# Patient Record
Sex: FEMALE | Race: WHITE | Employment: FULL TIME | URBAN - METROPOLITAN AREA
[De-identification: names, ages, dates, MRNs, and addresses within clinical notes are randomized per-mention and may not be internally consistent; named-entity substitution may affect disease eponyms.]

---

## 2017-04-28 ENCOUNTER — HOSPITAL ENCOUNTER (OUTPATIENT)
Age: 62
Setting detail: SPECIMEN
Discharge: HOME | End: 2017-04-28
Payer: COMMERCIAL

## 2017-04-28 LAB
ABSOLUTE EOS #: 0.2 K/UL (ref 0–0.4)
ABSOLUTE LYMPH #: 3 K/UL (ref 1–4.8)
ABSOLUTE MONO #: 0.5 K/UL (ref 0.1–1.2)
ALBUMIN SERPL-MCNC: 4.2 G/DL (ref 3.5–5.2)
ALBUMIN/GLOBULIN RATIO: 1.6 (ref 1–2.5)
ALP BLD-CCNC: 83 U/L (ref 35–104)
ALT SERPL-CCNC: 33 U/L (ref 5–33)
ANION GAP SERPL CALCULATED.3IONS-SCNC: 15 MMOL/L (ref 9–17)
AST SERPL-CCNC: 20 U/L
BASOPHILS # BLD: 1 %
BASOPHILS ABSOLUTE: 0 K/UL (ref 0–0.2)
BILIRUB SERPL-MCNC: 0.26 MG/DL (ref 0.3–1.2)
BUN BLDV-MCNC: 8 MG/DL (ref 8–23)
BUN/CREAT BLD: ABNORMAL (ref 9–20)
CALCIUM SERPL-MCNC: 9.3 MG/DL (ref 8.6–10.4)
CHLORIDE BLD-SCNC: 103 MMOL/L (ref 98–107)
CHOLESTEROL/HDL RATIO: 4.8
CHOLESTEROL: 216 MG/DL
CO2: 24 MMOL/L (ref 20–31)
CREAT SERPL-MCNC: 0.53 MG/DL (ref 0.5–0.9)
CREATININE URINE: 14.7 MG/DL (ref 28–217)
DIFFERENTIAL TYPE: NORMAL
EOSINOPHILS RELATIVE PERCENT: 2 %
GFR AFRICAN AMERICAN: >60 ML/MIN
GFR NON-AFRICAN AMERICAN: >60 ML/MIN
GFR SERPL CREATININE-BSD FRML MDRD: ABNORMAL ML/MIN/{1.73_M2}
GFR SERPL CREATININE-BSD FRML MDRD: ABNORMAL ML/MIN/{1.73_M2}
GLUCOSE BLD-MCNC: 86 MG/DL (ref 70–99)
HCT VFR BLD CALC: 39.2 % (ref 36–46)
HDLC SERPL-MCNC: 45 MG/DL
HEMOGLOBIN: 13.1 G/DL (ref 12–16)
LDL CHOLESTEROL: 128 MG/DL (ref 0–130)
LYMPHOCYTES # BLD: 38 %
MCH RBC QN AUTO: 27.7 PG (ref 26–34)
MCHC RBC AUTO-ENTMCNC: 33.3 G/DL (ref 31–37)
MCV RBC AUTO: 83 FL (ref 80–100)
MICROALBUMIN/CREAT 24H UR: <12 MG/L
MICROALBUMIN/CREAT UR-RTO: 82 MCG/MG CREAT
MONOCYTES # BLD: 6 %
PDW BLD-RTO: 14.5 % (ref 12.5–15.4)
PLATELET # BLD: 308 K/UL (ref 140–450)
PLATELET ESTIMATE: NORMAL
PMV BLD AUTO: 9.3 FL (ref 6–12)
POTASSIUM SERPL-SCNC: 4.7 MMOL/L (ref 3.7–5.3)
RBC # BLD: 4.72 M/UL (ref 4–5.2)
RBC # BLD: NORMAL 10*6/UL
SEG NEUTROPHILS: 53 %
SEGMENTED NEUTROPHILS ABSOLUTE COUNT: 4.2 K/UL (ref 1.8–7.7)
SODIUM BLD-SCNC: 142 MMOL/L (ref 135–144)
TOTAL PROTEIN: 6.8 G/DL (ref 6.4–8.3)
TRIGL SERPL-MCNC: 215 MG/DL
VLDLC SERPL CALC-MCNC: ABNORMAL MG/DL (ref 1–30)
WBC # BLD: 7.9 K/UL (ref 3.5–11)
WBC # BLD: NORMAL 10*3/UL

## 2017-05-23 ENCOUNTER — HOSPITAL ENCOUNTER (OUTPATIENT)
Age: 62
Setting detail: SPECIMEN
Discharge: HOME | End: 2017-05-23
Payer: COMMERCIAL

## 2017-05-26 LAB — LYME ANTIBODY: 0.26

## 2017-07-07 ENCOUNTER — HOSPITAL ENCOUNTER (OUTPATIENT)
Age: 62
Setting detail: SPECIMEN
Discharge: HOME | End: 2017-07-07
Payer: COMMERCIAL

## 2017-07-07 LAB
ABSOLUTE EOS #: 0.2 K/UL (ref 0–0.4)
ABSOLUTE LYMPH #: 2.3 K/UL (ref 1–4.8)
ABSOLUTE MONO #: 0.4 K/UL (ref 0.1–1.2)
ALBUMIN SERPL-MCNC: 4.1 G/DL (ref 3.5–5.2)
ALBUMIN/GLOBULIN RATIO: 1.6 (ref 1–2.5)
ALP BLD-CCNC: 87 U/L (ref 35–104)
ALT SERPL-CCNC: 33 U/L (ref 5–33)
ANION GAP SERPL CALCULATED.3IONS-SCNC: 13 MMOL/L (ref 9–17)
AST SERPL-CCNC: 21 U/L
BASOPHILS # BLD: 1 %
BASOPHILS ABSOLUTE: 0.1 K/UL (ref 0–0.2)
BILIRUB SERPL-MCNC: 0.3 MG/DL (ref 0.3–1.2)
BUN BLDV-MCNC: 11 MG/DL (ref 8–23)
BUN/CREAT BLD: ABNORMAL (ref 9–20)
CALCIUM SERPL-MCNC: 9.3 MG/DL (ref 8.6–10.4)
CHLORIDE BLD-SCNC: 100 MMOL/L (ref 98–107)
CO2: 26 MMOL/L (ref 20–31)
CREAT SERPL-MCNC: 0.59 MG/DL (ref 0.5–0.9)
CREATININE URINE: 120.4 MG/DL (ref 28–217)
DIFFERENTIAL TYPE: NORMAL
EOSINOPHILS RELATIVE PERCENT: 3 %
GFR AFRICAN AMERICAN: >60 ML/MIN
GFR NON-AFRICAN AMERICAN: >60 ML/MIN
GFR SERPL CREATININE-BSD FRML MDRD: ABNORMAL ML/MIN/{1.73_M2}
GFR SERPL CREATININE-BSD FRML MDRD: ABNORMAL ML/MIN/{1.73_M2}
GLUCOSE BLD-MCNC: 109 MG/DL (ref 70–99)
HCT VFR BLD CALC: 39.6 % (ref 36–46)
HEMOGLOBIN: 13.1 G/DL (ref 12–16)
LYMPHOCYTES # BLD: 29 %
MCH RBC QN AUTO: 27.6 PG (ref 26–34)
MCHC RBC AUTO-ENTMCNC: 33.2 G/DL (ref 31–37)
MCV RBC AUTO: 83.2 FL (ref 80–100)
MICROALBUMIN/CREAT 24H UR: <12 MG/L
MICROALBUMIN/CREAT UR-RTO: 10 MCG/MG CREAT
MONOCYTES # BLD: 6 %
PDW BLD-RTO: 14.6 % (ref 12.5–15.4)
PLATELET # BLD: 289 K/UL (ref 140–450)
PLATELET ESTIMATE: NORMAL
PMV BLD AUTO: 9.2 FL (ref 6–12)
POTASSIUM SERPL-SCNC: 4.5 MMOL/L (ref 3.7–5.3)
RBC # BLD: 4.76 M/UL (ref 4–5.2)
RBC # BLD: NORMAL 10*6/UL
SEG NEUTROPHILS: 61 %
SEGMENTED NEUTROPHILS ABSOLUTE COUNT: 4.9 K/UL (ref 1.8–7.7)
SODIUM BLD-SCNC: 139 MMOL/L (ref 135–144)
TOTAL PROTEIN: 6.7 G/DL (ref 6.4–8.3)
WBC # BLD: 7.9 K/UL (ref 3.5–11)
WBC # BLD: NORMAL 10*3/UL

## 2019-11-23 ENCOUNTER — HOSPITAL ENCOUNTER (OUTPATIENT)
Dept: MAMMOGRAPHY | Age: 64
Discharge: HOME | End: 2019-11-25
Payer: COMMERCIAL

## 2019-11-23 DIAGNOSIS — Z13.820 SCREENING FOR OSTEOPOROSIS: ICD-10-CM

## 2019-11-23 DIAGNOSIS — Z12.31 ENCOUNTER FOR SCREENING MAMMOGRAM FOR BREAST CANCER: ICD-10-CM

## 2019-11-23 PROCEDURE — 77080 DXA BONE DENSITY AXIAL: CPT

## 2019-11-23 PROCEDURE — 77063 BREAST TOMOSYNTHESIS BI: CPT

## 2020-01-28 ENCOUNTER — TELEPHONE (OUTPATIENT)
Dept: DERMATOLOGY | Age: 65
End: 2020-01-28

## 2020-02-27 ENCOUNTER — HOSPITAL ENCOUNTER (OUTPATIENT)
Dept: GENERAL RADIOLOGY | Age: 65
Discharge: HOME | End: 2020-02-29
Payer: COMMERCIAL

## 2020-02-27 ENCOUNTER — OFFICE VISIT (OUTPATIENT)
Dept: ORTHOPEDIC SURGERY | Age: 65
End: 2020-02-27
Payer: COMMERCIAL

## 2020-02-27 ENCOUNTER — HOSPITAL ENCOUNTER (OUTPATIENT)
Age: 65
Discharge: HOME | End: 2020-02-29
Payer: COMMERCIAL

## 2020-02-27 VITALS — WEIGHT: 270 LBS | HEIGHT: 62 IN | BODY MASS INDEX: 49.69 KG/M2

## 2020-02-27 PROCEDURE — 73562 X-RAY EXAM OF KNEE 3: CPT

## 2020-02-27 PROCEDURE — 99212 OFFICE O/P EST SF 10 MIN: CPT | Performed by: ORTHOPAEDIC SURGERY

## 2020-02-27 PROCEDURE — G8484 FLU IMMUNIZE NO ADMIN: HCPCS | Performed by: ORTHOPAEDIC SURGERY

## 2020-02-27 PROCEDURE — 1036F TOBACCO NON-USER: CPT | Performed by: ORTHOPAEDIC SURGERY

## 2020-02-27 PROCEDURE — 3017F COLORECTAL CA SCREEN DOC REV: CPT | Performed by: ORTHOPAEDIC SURGERY

## 2020-02-27 PROCEDURE — G8417 CALC BMI ABV UP PARAM F/U: HCPCS | Performed by: ORTHOPAEDIC SURGERY

## 2020-02-27 PROCEDURE — G8427 DOCREV CUR MEDS BY ELIG CLIN: HCPCS | Performed by: ORTHOPAEDIC SURGERY

## 2020-02-27 PROCEDURE — 20610 DRAIN/INJ JOINT/BURSA W/O US: CPT | Performed by: ORTHOPAEDIC SURGERY

## 2020-02-27 RX ORDER — NAPROXEN 500 MG/1
500 TABLET ORAL 2 TIMES DAILY WITH MEALS
Qty: 60 TABLET | Refills: 3 | Status: SHIPPED | OUTPATIENT
Start: 2020-02-27 | End: 2020-08-04

## 2020-02-27 NOTE — PROGRESS NOTES
Chief Complaint   Patient presents with    Knee Pain     right     This patient is seen here for an pain in the right knee. The patient says that she lost her  recently and she had gone to a cruise and did a lot of walking. Since she came back she has been having a lot of pain in the right knee. She describes the pain across the knee joint. More of the pain is on the lateral side. There is no history of instability or locking episode. The pain is constant. It is worse first thing in the morning and then also with start up pain. Examination: She stands with valgus deformity bilaterally. She has excellent range of motion. There is no instability. I reviewed her x-rays which show that the she has significant bilateral valgus osteoarthritis of the knees. Diagnosis: Symptomatic right knee valgus osteoarthritis. Treatment: Under sterile condition I injected 40 mg Depo-Medrol and 5 cc of 1% plain lidocaine through anteromedial portal without any complications into the right knee. The patient is going to go to [place] and I told her to come return to see us when she comes back from [place]. Have already explained to her how the injection works. She should use a cane in the left hand while she is there because she is going to be doing quite a bit of walking. If she comes back she is asymptomatic then she will call and cancel the appointment.

## 2020-08-04 ENCOUNTER — OFFICE VISIT (OUTPATIENT)
Dept: DERMATOLOGY | Age: 65
End: 2020-08-04
Payer: COMMERCIAL

## 2020-08-04 VITALS
BODY MASS INDEX: 51.41 KG/M2 | TEMPERATURE: 97 F | HEIGHT: 62 IN | WEIGHT: 279.4 LBS | DIASTOLIC BLOOD PRESSURE: 80 MMHG | OXYGEN SATURATION: 96 % | SYSTOLIC BLOOD PRESSURE: 136 MMHG | HEART RATE: 66 BPM

## 2020-08-04 PROCEDURE — G8427 DOCREV CUR MEDS BY ELIG CLIN: HCPCS | Performed by: DERMATOLOGY

## 2020-08-04 PROCEDURE — 17110 DESTRUCTION B9 LES UP TO 14: CPT | Performed by: DERMATOLOGY

## 2020-08-04 PROCEDURE — 3017F COLORECTAL CA SCREEN DOC REV: CPT | Performed by: DERMATOLOGY

## 2020-08-04 PROCEDURE — 99203 OFFICE O/P NEW LOW 30 MIN: CPT | Performed by: DERMATOLOGY

## 2020-08-04 PROCEDURE — G8417 CALC BMI ABV UP PARAM F/U: HCPCS | Performed by: DERMATOLOGY

## 2020-08-04 PROCEDURE — 1036F TOBACCO NON-USER: CPT | Performed by: DERMATOLOGY

## 2020-08-04 RX ORDER — KETOCONAZOLE 20 MG/G
CREAM TOPICAL
Qty: 60 G | Refills: 2 | Status: SHIPPED | OUTPATIENT
Start: 2020-08-04

## 2020-08-04 RX ORDER — BIOTIN 1 MG
1000 TABLET ORAL DAILY
COMMUNITY

## 2020-08-04 RX ORDER — LANOLIN ALCOHOL/MO/W.PET/CERES
CREAM (GRAM) TOPICAL
COMMUNITY

## 2020-08-04 NOTE — PATIENT INSTRUCTIONS
PM.      Moles    Moles, or nevi, are very common. Moles are areas of the skin where there are more cells called melanocytes. Melanocytes are the cells in the body that produce pigment, or color. Moles can be many colors including skin-tone, pink, tan, brown, and very dark brown to black. Moles can be raised or flat. Moles can have hair. Moles can grow on any skin surface, including the scalp, hands and feet. When someone is born with a mole, or develops one in the first months of life, the mole is called a congenital, or birthmark mole. About 1 in 100 people are born with one or more moles. Most people develop their moles later in childhood or adulthood. These are called acquired moles. They are most common on sun exposed areas of skin such as the face, neck, upper body, arms and legs. CHECKING MOLES  Most moles are harmless, but in rare cases moles may become cancerous. Checking moles and looking for changes is an important step in helping to catch worrisome changes early. Some changes to look for are asymmetry (moles that do not look the same on each half), irregular shapes or borders, uneven color or large size. Also look for any moles that bleed, itch, or become painful. Looking at your skin regularly can help you recognize moles that are more at risk for becoming cancerous. WHEN TO CALL THE DOCTOR  Call your doctor if you see any of the following changes in a mole:       Irregular borders (uneven shape or edges)       Changes in color to black, blue or red.      Changes in the surface texture       Scabs, scaling, irritation or bleeding in the mole    TREATMENT FOR MOLES  Often we can simply look at your moles and tell you if they look worrisome. If we are not concerned about the look of your moles at your appointment, we may measure some moles and take some photos that will allow us to watch for future changes in the moles.      TREATMENT FOR MOLES  If a mole is getting irritated frequently, circulation. Intertrigo frequently is worsened by infection, which most commonly is with Candida species. Seborrheic Keratosis  Seborrheic keratoses are common benign growths of unknown cause seen in adults due to a thickening of an area of the top skin layer. Who's At Risk  Although they can occur anytime after puberty, almost everyone over 48 has one or more of these and they increase in number with age. Some families have an inherited tendency to grow multiple lesions. Men and women are equally as likely to develop seborrheic keratoses. Dark-skinned people are less affected than those with light skin; a variant seen in blacks is called dermatosis papulosa nigra. Signs & Symptoms  One or more spots can occur anywhere on the body, except for palms, soles, and mucous membranes (eg, in the mouth or rectum). They do not go away. They do not turn into cancers, but some cancers resemble seborrheic keratosis. They start as light brown to skin-colored, flat areas, which are round to oval and of varying size (usually less than a half inch, but sometimes much larger). As they grow thicker and rise above the skin surface, seborrheic keratoses may become dark brown to almost black with a \"stuck on\" appearance. The surface may feel smooth or rough. Self-Care Guidelines  No treatment is needed unless there is irritation from clothing with itching or bleeding. There is no way to prevent new spots from forming. Some lotions with alpha hydroxyl acids may make the areas feel smoother with regular use but will not eliminate them. OTC freezing techniques are available but usually not effective. When to [facility]  If a spot on the skin is growing, bleeding, painful, or itchy, or any other concerning changes, then see your doctor. Tonye Cogan

## 2022-07-25 LAB — MAMMOGRAPHY, EXTERNAL: NORMAL

## 2022-09-29 NOTE — PROGRESS NOTES
Dermatology Patient Note  [facility] [address] [facility]  [address]  [address]  Dept: [phone]  Dept Fax: [phone]      VISIT DATE: 8/4/2020   REFERRING PROVIDER: No ref. provider found      Kristin Stout is a 59 y.o. female  who presents today in the office for:    New Patient (Spots of concern on the arms that are multiplying, also has a rash under breasts and in folds near groin. It itches and burns after scratching. Tried baby powder and creams but nothing works. Athletes foot cream was recommended and it made it much worse)      HISTORY OF PRESENT ILLNESS:  HPI AK/NMSC    Nilay Agudelo was seen today for initial evaluation of Sun Damage    Duration of Lesion/Lesions:developed over years    Course: new lesion itchy on back and right arm     Areas of Involvement: Sun exposed skin surfaces> covered    Associated Symptoms:Itching    Exacerbating Factors:Hx of Prolonged Sun Exposure    Previous Skin Cancers: none    Problem Specific Family Hx: none    Also has a rash under breasts and in groin folds that has not improved with baby powder      CURRENT MEDICATIONS:   Current Outpatient Medications   Medication Sig Dispense Refill    Biotin 1000 MCG TABS Take 1,000 mcg by mouth daily      Cholecalciferol (VITAMIN D3) 125 MCG (5000 UT) CAPS GNP Vitamin D Super Strength 5000 UNIT Oral Tablet  Take 1 tablet daily   Refills: 0  Active      vitamin B-12 (CYANOCOBALAMIN) 1000 MCG tablet Vitamin B-12 1000 MCG Oral Tablet  TAKE 1 TABLET DAILY AS DIRECTED. Refills: 0  Active      ketoconazole (NIZORAL) 2 % cream Apply twice daily to groin rash 60 g 2    sertraline (ZOLOFT) 50 MG tablet       Handicap Placard MISC by Does not apply route. For 3 months only 1 each 0    Loratadine (CLARITIN PO) Take  by mouth.  NEXIUM 40 MG capsule       TOPROL XL 50 MG XL tablet       traMADol (ULTRAM) 50 MG tablet        No current facility-administered medications for this visit. ALLERGIES:   Allergies   Allergen Reactions    Aspirin Nausea And Vomiting       SOCIAL HISTORY:  Social History     Tobacco Use    Smoking status: Former Smoker     Last attempt to quit: 2000     Years since quittin.1    Smokeless tobacco: Never Used   Substance Use Topics    Alcohol use: Not Currently       REVIEW OF SYSTEMS:  Review of Systems   Constitutional: Negative. Skin:Denies any new changing, growing or bleeding lesions or rashes except as described in the HPI     PHYSICAL EXAM:   /80 (Site: Right Lower Arm, Position: Sitting, Cuff Size: Medium Adult)   Pulse 66   Temp 97 °F (36.1 °C)   Ht 5' 2\" (1.575 m)   Wt 279 lb 6.4 oz (126.7 kg)   SpO2 96%   BMI 51.10 kg/m²     General Exam:  General Appearance: No acute distress, Well nourished     Neuro: Alert and oriented to person, place and time  Psych: Normal affect   Lymph Node: Not performed    Cutaneous Exam: Performed as documented in clinic note below. Full skin,which includes the head/face, neck, both arms, chest, back, abdomen, both legs, genitalia and/or groin and/or buttocks, digits and/or nails, was examined. Pertinent Physical Exam Findings:  Physical Exam  Skin:            Comments: Actinic damage of the face, neck, trunk and upper and lower extremities           Medical Necessity of Exam Performed:   Distribution of patient concerns    Additional Diagnostic Testing performed during exam: Not performed ,  Not performed    ASSESSMENT:   Diagnosis Orders   1. Actinic skin damage     2. Inflamed seborrheic keratosis     3. Inflamed skin tag     4. Intertrigo         Plan of Action is as Follows:  Assessment 1. Actinic skin damage  Discussed sunscreen and sun protection - recommend SPF 30 or greater sunscreen applied every 2-3 hours, sun protective clothing and avoidance of peak sun.     2. Inflamed seborrheic keratosis  Cryotherapy: After verbal consent was obtained including discussion of the risks (lesion both UVA and UVB sunrays. Your sunscreen should contain at least one of the following ingredients: titanium dioxide, zinc oxide, or avobenzone. Sunscreen will not be effective unless it is applied to all exposed skin. Sunscreens work best if they are applied 30 minutes before sun exposure. They should be reapplied every 2 hours and after any water exposure. Sunscreen is not perfect. It is important to use other methods to protect the skin from sun exposure also. Wear hats, sunglasses and other sun protective clothing when outdoors. Stay in the shade during the peak hours of sun exposure between 10 AM and 4 PM.      Moles    Moles, or nevi, are very common. Moles are areas of the skin where there are more cells called melanocytes. Melanocytes are the cells in the body that produce pigment, or color. Moles can be many colors including skin-tone, pink, tan, brown, and very dark brown to black. Moles can be raised or flat. Moles can have hair. Moles can grow on any skin surface, including the scalp, hands and feet. When someone is born with a mole, or develops one in the first months of life, the mole is called a congenital, or birthmark mole. About 1 in 100 people are born with one or more moles. Most people develop their moles later in childhood or adulthood. These are called acquired moles. They are most common on sun exposed areas of skin such as the face, neck, upper body, arms and legs. CHECKING MOLES  Most moles are harmless, but in rare cases moles may become cancerous. Checking moles and looking for changes is an important step in helping to catch worrisome changes early. Some changes to look for are asymmetry (moles that do not look the same on each half), irregular shapes or borders, uneven color or large size. Also look for any moles that bleed, itch, or become painful. Looking at your skin regularly can help you recognize moles that are more at risk for becoming cancerous.      WHEN TO CALL THE DOCTOR  Call your doctor if you see any of the following changes in a mole:       Irregular borders (uneven shape or edges)       Changes in color to black, blue or red.      Changes in the surface texture       Scabs, scaling, irritation or bleeding in the mole    TREATMENT FOR MOLES  Often we can simply look at your moles and tell you if they look worrisome. If we are not concerned about the look of your moles at your appointment, we may measure some moles and take some photos that will allow us to watch for future changes in the moles. TREATMENT FOR MOLES  If a mole is getting irritated frequently, bleeding, difficult to watch due to location or dark color, atypical in appearance, or worrisome, we may perform a skin biopsy. A skin biopsy is a procedure that involves removing the mole so that it can be looked at under a microscope. There are many methods used to remove moles. The method we choose depends on the location of the mole, the size of the mole, and the amount of concern for skin cancer. Generally, removing moles in the dermatologists office is a simple and safe procedure that can be done with local anesthesia. PREVENTION  You can do some things to prevent moles from becoming cancerous:       Try to avoid long periods of time in the sun and severe sunburns. The sun is        especially dangerous between 10:00 am and 4:00 pm.       Use a broad spectrum, water-resistant sun block lotion with an SPF of 30 or        greater. A broad spectrum lotion blocks both UVA and UVB rays from the sun. Re-apply sunscreen at least every 2 hours and after swimming or sweating.      Take advantage of shade whenever possible. Wear a broad-brimmed hat,        sunglasses, and protective clothing when outdoors.      Do not use tanning beds. Cryotherapy    Liquid Nitrogen - \"freeze\" (Cryotherapy)  Your doctor has treated your skin lesions with a very cold substance.   The liquid nitrogen is so cold that it may feel like the skin is burning during application. A clear blister or blood blister may form after treatment and may later form a scab. Leave the area alone. Usually this scab will fall of within 1-2 weeks. The area should be kept clean and can be covered with Vaseline and a Band-Aid if needed. If a large blister develops it is ok to use a clean needle to gently pop the blister. Please call our office with any concerns at [phone]. Intertrigo (intertriginous dermatitis) is an inflammatory condition of skin folds, induced or aggravated by heat, moisture, maceration, friction, and lack of air circulation. Intertrigo frequently is worsened by infection, which most commonly is with Candida species. Seborrheic Keratosis  Seborrheic keratoses are common benign growths of unknown cause seen in adults due to a thickening of an area of the top skin layer. Who's At Risk  Although they can occur anytime after puberty, almost everyone over 48 has one or more of these and they increase in number with age. Some families have an inherited tendency to grow multiple lesions. Men and women are equally as likely to develop seborrheic keratoses. Dark-skinned people are less affected than those with light skin; a variant seen in blacks is called dermatosis papulosa nigra. Signs & Symptoms  One or more spots can occur anywhere on the body, except for palms, soles, and mucous membranes (eg, in the mouth or rectum). They do not go away. They do not turn into cancers, but some cancers resemble seborrheic keratosis. They start as light brown to skin-colored, flat areas, which are round to oval and of varying size (usually less than a half inch, but sometimes much larger). As they grow thicker and rise above the skin surface, seborrheic keratoses may become dark brown to almost black with a \"stuck on\" appearance. The surface may feel smooth or rough.   Self-Care Guidelines  No treatment is needed unless there is irritation from clothing with itching or bleeding. There is no way to prevent new spots from forming. Some lotions with alpha hydroxyl acids may make the areas feel smoother with regular use but will not eliminate them. OTC freezing techniques are available but usually not effective. When to [facility]  If a spot on the skin is growing, bleeding, painful, or itchy, or any other concerning changes, then see your doctor. .        Photo surveillance performed: No    Follow-up: 1 year    This note was created with the assistance of aspeech-recognition program.  Although the intention is to generate a document that actually reflects thecontent of the visit, no guarantees can be provided that every mistake has been identified and corrected by editing.     Electronically signed by Chirag Soto MD on 8/4/20 at 11:40 AM EDT 70

## 2023-06-29 LAB
CHOLESTEROL, TOTAL: 217 MG/DL
CHOLESTEROL/HDL RATIO: 5.2
HDLC SERPL-MCNC: 42 MG/DL (ref 35–70)
LDL CHOLESTEROL CALCULATED: 107 MG/DL (ref 0–160)
NONHDLC SERPL-MCNC: ABNORMAL MG/DL
TRIGL SERPL-MCNC: 339 MG/DL
VLDLC SERPL CALC-MCNC: 68 MG/DL

## 2023-07-14 DIAGNOSIS — M16.0 PRIMARY OSTEOARTHRITIS OF BOTH HIPS: Primary | ICD-10-CM

## 2023-07-14 RX ORDER — TRAMADOL HYDROCHLORIDE 50 MG/1
50 TABLET ORAL 2 TIMES DAILY
COMMUNITY
End: 2023-07-14 | Stop reason: SDUPTHER

## 2023-07-14 RX ORDER — TRAMADOL HYDROCHLORIDE 50 MG/1
50 TABLET ORAL 2 TIMES DAILY
Qty: 60 TABLET | Refills: 0 | Status: SHIPPED | OUTPATIENT
Start: 2023-07-14 | End: 2023-08-13

## 2023-08-18 DIAGNOSIS — M16.0 PRIMARY OSTEOARTHRITIS OF BOTH HIPS: Primary | ICD-10-CM

## 2023-08-18 RX ORDER — TRAMADOL HYDROCHLORIDE 50 MG/1
50 TABLET ORAL 2 TIMES DAILY
COMMUNITY
End: 2023-08-18 | Stop reason: SDUPTHER

## 2023-08-18 RX ORDER — TRAMADOL HYDROCHLORIDE 50 MG/1
50 TABLET ORAL 2 TIMES DAILY
Qty: 60 TABLET | Refills: 0 | Status: SHIPPED | OUTPATIENT
Start: 2023-08-18 | End: 2023-09-17

## 2023-09-25 DIAGNOSIS — M16.0 PRIMARY OSTEOARTHRITIS OF BOTH HIPS: Primary | ICD-10-CM

## 2023-09-25 RX ORDER — TRAMADOL HYDROCHLORIDE 50 MG/1
50 TABLET ORAL EVERY 4 HOURS PRN
Qty: 18 TABLET | Refills: 0 | Status: SHIPPED | OUTPATIENT
Start: 2023-09-25 | End: 2023-09-28

## 2023-10-18 DIAGNOSIS — M16.0 PRIMARY OSTEOARTHRITIS OF BOTH HIPS: Primary | ICD-10-CM

## 2023-10-18 RX ORDER — TRAMADOL HYDROCHLORIDE 50 MG/1
50 TABLET ORAL EVERY 8 HOURS PRN
Qty: 42 TABLET | Refills: 0 | Status: SHIPPED | OUTPATIENT
Start: 2023-10-18 | End: 2023-11-17

## 2023-11-09 RX ORDER — METOPROLOL SUCCINATE 50 MG/1
50 TABLET, EXTENDED RELEASE ORAL DAILY
Qty: 90 TABLET | Refills: 3 | Status: SHIPPED | OUTPATIENT
Start: 2023-11-09

## 2023-11-14 PROBLEM — E66.813 CLASS 3 SEVERE OBESITY DUE TO EXCESS CALORIES WITH BODY MASS INDEX (BMI) OF 45.0 TO 49.9 IN ADULT: Status: ACTIVE | Noted: 2023-11-14

## 2023-11-14 PROBLEM — Z78.9 NON-SMOKER: Status: ACTIVE | Noted: 2023-11-14

## 2023-11-14 PROBLEM — E66.01 CLASS 3 SEVERE OBESITY DUE TO EXCESS CALORIES WITH BODY MASS INDEX (BMI) OF 45.0 TO 49.9 IN ADULT (HCC): Status: ACTIVE | Noted: 2023-11-14

## 2023-11-14 NOTE — PATIENT INSTRUCTIONS
Continue on diet and lifestyle changes. , I would continue to recommend a diet focused on vegetables, fruits, health protein that minimizes sweets, watch more than 1 portion of carbohydrates in a meal, avoid high sugar beverages and excess red meats. Maintain a healthy BMI 30-35 good goal for you. Work to a lifestyle of regular a aerobic exercise = 40 minutes mild to moderate physical activity 3-4 times a week. Keep working on diet and exercise as you have done in the past. A good goal would be to come down about 10 lb in the next 3 months. Also moderate exercise as tolerated. Followup for medicare annual wellness.

## 2023-11-14 NOTE — PROGRESS NOTES
[id number] [facility]  [address] [facility] [place], [address] [facility]  Phone: [phone]  Fax: [phone]      Date of Visit:  2023  Patient Name: Debbie Perez   Patient :  1955     Assessment:     1. Chronic left hip pain    2. Class 3 severe obesity due to excess calories without serious comorbidity with body mass index (BMI) of 45.0 to 49.9 in adult ([address] [facility])    3. Non-smoker    4. Primary hypertension    5. Gastroesophageal reflux disease without esophagitis    6. Vitamin D deficiency    7. Mixed hyperlipidemia    8. Diverticulitis    9. Primary osteoarthritis of both hips    10. Sprain of left rotator cuff capsule, initial encounter        Plan:    BMI was elevated today, and weight loss plan recommended is : conventional weight loss. Patient Instructions   Continue on diet and lifestyle changes. , I would continue to recommend a diet focused on vegetables, fruits, health protein that minimizes sweets, watch more than 1 portion of carbohydrates in a meal, avoid high sugar beverages and excess red meats. Maintain a healthy BMI 30-35 good goal for you. Work to a lifestyle of regular a aerobic exercise = 40 minutes mild to moderate physical activity 3-4 times a week. Keep working on diet and exercise as you have done in the past. A good goal would be to come down about 10 lb in the next 3 months. Also moderate exercise as tolerated. Followup for medicare annual wellness. Requested Prescriptions     Signed Prescriptions Disp Refills    traMADol (ULTRAM) 50 MG tablet 60 tablet 0     Sig: Take 1 tablet by mouth every 8 hours as needed for Pain for up to 30 days. Intended supply: 3 days. Take lowest dose possible to manage pain Max Daily Amount: 150 mg       Medications Discontinued During This Encounter   Medication Reason    traMADol (ULTRAM) 50 MG tablet REORDER    Emollient (COLLAGEN EX) ERROR       No follow-ups on file.     Conrad Clay was

## 2023-11-16 ENCOUNTER — OFFICE VISIT (OUTPATIENT)
Age: 68
End: 2023-11-16
Payer: MEDICARE

## 2023-11-16 VITALS
TEMPERATURE: 97.7 F | DIASTOLIC BLOOD PRESSURE: 62 MMHG | BODY MASS INDEX: 46.93 KG/M2 | HEART RATE: 74 BPM | SYSTOLIC BLOOD PRESSURE: 137 MMHG | RESPIRATION RATE: 16 BRPM | WEIGHT: 255 LBS | HEIGHT: 62 IN

## 2023-11-16 DIAGNOSIS — Z78.9 NON-SMOKER: ICD-10-CM

## 2023-11-16 DIAGNOSIS — E66.01 CLASS 3 SEVERE OBESITY DUE TO EXCESS CALORIES WITHOUT SERIOUS COMORBIDITY WITH BODY MASS INDEX (BMI) OF 45.0 TO 49.9 IN ADULT (HCC): ICD-10-CM

## 2023-11-16 DIAGNOSIS — K21.9 GASTROESOPHAGEAL REFLUX DISEASE WITHOUT ESOPHAGITIS: ICD-10-CM

## 2023-11-16 DIAGNOSIS — K57.92 DIVERTICULITIS: ICD-10-CM

## 2023-11-16 DIAGNOSIS — E55.9 VITAMIN D DEFICIENCY: ICD-10-CM

## 2023-11-16 DIAGNOSIS — E78.2 MIXED HYPERLIPIDEMIA: ICD-10-CM

## 2023-11-16 DIAGNOSIS — I10 PRIMARY HYPERTENSION: ICD-10-CM

## 2023-11-16 DIAGNOSIS — M25.552 CHRONIC LEFT HIP PAIN: Primary | ICD-10-CM

## 2023-11-16 DIAGNOSIS — S43.422A SPRAIN OF LEFT ROTATOR CUFF CAPSULE, INITIAL ENCOUNTER: ICD-10-CM

## 2023-11-16 DIAGNOSIS — G89.29 CHRONIC LEFT HIP PAIN: Primary | ICD-10-CM

## 2023-11-16 DIAGNOSIS — M16.0 PRIMARY OSTEOARTHRITIS OF BOTH HIPS: ICD-10-CM

## 2023-11-16 PROCEDURE — 99214 OFFICE O/P EST MOD 30 MIN: CPT

## 2023-11-16 PROCEDURE — 99214 OFFICE O/P EST MOD 30 MIN: CPT | Performed by: FAMILY MEDICINE

## 2023-11-16 PROCEDURE — 1123F ACP DISCUSS/DSCN MKR DOCD: CPT | Performed by: FAMILY MEDICINE

## 2023-11-16 PROCEDURE — 3075F SYST BP GE 130 - 139MM HG: CPT | Performed by: FAMILY MEDICINE

## 2023-11-16 PROCEDURE — 3078F DIAST BP <80 MM HG: CPT | Performed by: FAMILY MEDICINE

## 2023-11-16 RX ORDER — TRAMADOL HYDROCHLORIDE 50 MG/1
50 TABLET ORAL EVERY 8 HOURS PRN
Qty: 60 TABLET | Refills: 0 | Status: SHIPPED | OUTPATIENT
Start: 2023-11-16 | End: 2023-12-16

## 2023-11-16 SDOH — ECONOMIC STABILITY: FOOD INSECURITY: WITHIN THE PAST 12 MONTHS, YOU WORRIED THAT YOUR FOOD WOULD RUN OUT BEFORE YOU GOT MONEY TO BUY MORE.: NEVER TRUE

## 2023-11-16 SDOH — ECONOMIC STABILITY: INCOME INSECURITY: HOW HARD IS IT FOR YOU TO PAY FOR THE VERY BASICS LIKE FOOD, HOUSING, MEDICAL CARE, AND HEATING?: NOT HARD AT ALL

## 2023-11-16 SDOH — ECONOMIC STABILITY: HOUSING INSECURITY
IN THE LAST 12 MONTHS, WAS THERE A TIME WHEN YOU DID NOT HAVE A STEADY PLACE TO SLEEP OR SLEPT IN A SHELTER (INCLUDING NOW)?: NO

## 2023-11-16 SDOH — ECONOMIC STABILITY: FOOD INSECURITY: WITHIN THE PAST 12 MONTHS, THE FOOD YOU BOUGHT JUST DIDN'T LAST AND YOU DIDN'T HAVE MONEY TO GET MORE.: NEVER TRUE

## 2023-11-16 ASSESSMENT — PATIENT HEALTH QUESTIONNAIRE - PHQ9
1. LITTLE INTEREST OR PLEASURE IN DOING THINGS: 0
SUM OF ALL RESPONSES TO PHQ QUESTIONS 1-9: 0
2. FEELING DOWN, DEPRESSED OR HOPELESS: 0
SUM OF ALL RESPONSES TO PHQ QUESTIONS 1-9: 0
SUM OF ALL RESPONSES TO PHQ9 QUESTIONS 1 & 2: 0

## 2024-01-08 DIAGNOSIS — M16.0 PRIMARY OSTEOARTHRITIS OF BOTH HIPS: ICD-10-CM

## 2024-01-08 RX ORDER — TRAMADOL HYDROCHLORIDE 50 MG/1
50 TABLET ORAL EVERY 12 HOURS
Qty: 60 TABLET | Refills: 0 | Status: SHIPPED | OUTPATIENT
Start: 2024-01-08 | End: 2024-02-07

## 2024-02-10 NOTE — PROGRESS NOTES
Medicare Service Recommended Frequency Last Done Due next   Pneumonia vaccine After 65, Prevnar 20 ONCE You received the Pneumovax 23 on 11/3/2020 You are due for the Prevnar 20 today   Influenza vaccine Yearly 2020 Please consider getting your flu shot each season   Zoster/Shingles Shingrix vaccine:  2 shots 2-6 months apart  You received the Shingrix on 3/29/2020 and 1/18/2020 You are up-to-date and will not need any more shingles vaccines   COVID Variable You had 2 COVID vaccines You could get the booster if you would like   Tetanus vaccine (Td or Tdap) Every 10 years  Your last tetanus vaccine was on 5/14/2013 You are due at this time so please consider getting this at the pharmacy   Mammogram Every 1-2 years (ages ~40-75) Your last mammogram was on 8/18/2022 They recommended a 1 year repeat so you are overdue at this time.  I can have Dr. Ferrer order one for you   Colorectal Cancer Screening FIT test yearly, Cologuard every 3 years, Colonoscopy every 10 years *unless otherwise indicated* You had a colonoscopy on 8/18/2021 The report indicated a 10 year repeat   Cardiovascular/cholesterol screening As determined by your physician 6/29/23  Total cholesterol: 217  Triglycerides: 339  HDL (good cholesterol): 42  LDL (bad cholesterol): 107 As determined by your physician   Diabetes screening   As determined by your physician 6/29/23  A1c/glucose: 93 As determined by your physician   Osteoporosis screening   DEXA every 2 years for females (ages 65-85) You had a DEXA on 8/18/2022 which was normal You will be due after 8/18/2024   Screening for abdominal aortic aneurysm One-time screening in patients if you have a family history of abdominal aortic aneurysms, or you're a man 65-75 and have smoked at least 100 cigarettes in your lifetime Does not qualify    Low Dose CT/Lung Cancer  Annual ages 50 to 77 (80) years who have a 20 pack-year smoking history and currently smoke or have quit within the past 15 years Does not

## 2024-02-12 ENCOUNTER — OFFICE VISIT (OUTPATIENT)
Age: 69
End: 2024-02-12
Payer: MEDICARE

## 2024-02-12 VITALS
SYSTOLIC BLOOD PRESSURE: 110 MMHG | DIASTOLIC BLOOD PRESSURE: 53 MMHG | HEART RATE: 64 BPM | BODY MASS INDEX: 46.38 KG/M2 | HEIGHT: 62 IN | WEIGHT: 252 LBS | RESPIRATION RATE: 16 BRPM

## 2024-02-12 DIAGNOSIS — Z00.00 MEDICARE ANNUAL WELLNESS VISIT, SUBSEQUENT: Primary | ICD-10-CM

## 2024-02-12 PROCEDURE — G0439 PPPS, SUBSEQ VISIT: HCPCS | Performed by: PHARMACIST

## 2024-02-12 PROCEDURE — 3074F SYST BP LT 130 MM HG: CPT | Performed by: PHARMACIST

## 2024-02-12 PROCEDURE — 1123F ACP DISCUSS/DSCN MKR DOCD: CPT | Performed by: PHARMACIST

## 2024-02-12 PROCEDURE — 3078F DIAST BP <80 MM HG: CPT | Performed by: PHARMACIST

## 2024-02-12 RX ORDER — PHENOL 1.4 %
AEROSOL, SPRAY (ML) MUCOUS MEMBRANE
COMMUNITY

## 2024-02-12 RX ORDER — FAMOTIDINE, CALCIUM CARBONATE, AND MAGNESIUM HYDROXIDE 10; 800; 165 MG/1; MG/1; MG/1
1 TABLET, CHEWABLE ORAL AS NEEDED
COMMUNITY

## 2024-02-12 RX ORDER — CETIRIZINE HYDROCHLORIDE 10 MG/1
10 TABLET ORAL DAILY
COMMUNITY

## 2024-02-12 NOTE — PATIENT INSTRUCTIONS
Thank you for coming in today and allowing me to be part of your care team.    See if you can find some smoke detectors.  If you have questions about advanced directives, please call [facility] our  with any issues.   Medicare Service Recommended Frequency Last Done Due next   Pneumonia vaccine After 65, Prevnar 20 ONCE You received the Pneumovax 23 on 11/3/2020 You are due for the Prevnar 20 today   Influenza vaccine Yearly 2020 Please consider getting your flu shot each season   Zoster/Shingles Shingrix vaccine:  2 shots 2-6 months apart  You received the Shingrix on 3/29/2020 and 1/18/2020 You are up-to-date and will not need any more shingles vaccines   COVID Variable You had 2 COVID vaccines You could get the booster if you would like   Tetanus vaccine (Td or Tdap) Every 10 years  Your last tetanus vaccine was on 5/14/2013 You are due at this time so please consider getting this at the pharmacy   Mammogram Every 1-2 years (ages ~40-75) Your last mammogram was on 8/18/2022 They recommended a 1 year repeat so you are overdue at this time.  I can have Dr. Ferrer order one for you   Colorectal Cancer Screening FIT test yearly, Cologuard every 3 years, Colonoscopy every 10 years *unless otherwise indicated* You had a colonoscopy on 8/18/2021 The report indicated a 10 year repeat   Cardiovascular/cholesterol screening As determined by your physician 6/29/23  Total cholesterol: 217  Triglycerides: 339  HDL (good cholesterol): 42  LDL (bad cholesterol): 107 As determined by your physician   Diabetes screening   As determined by your physician 6/29/23  A1c/glucose: 93 As determined by your physician   Osteoporosis screening   DEXA every 2 years for females (ages 65-85) You had a DEXA on 8/18/2022 which was normal You will be due after 8/18/2024   Screening for abdominal aortic aneurysm One-time screening in patients if you have a family history of abdominal aortic aneurysms, or you're a man 65-75 and have smoked at

## 2024-03-11 DIAGNOSIS — M16.0 PRIMARY OSTEOARTHRITIS OF BOTH HIPS: ICD-10-CM

## 2024-03-11 RX ORDER — TRAMADOL HYDROCHLORIDE 50 MG/1
50 TABLET ORAL EVERY 8 HOURS PRN
Qty: 60 TABLET | Refills: 0 | Status: SHIPPED | OUTPATIENT
Start: 2024-03-11 | End: 2024-04-10

## 2024-05-07 DIAGNOSIS — M16.0 PRIMARY OSTEOARTHRITIS OF BOTH HIPS: ICD-10-CM

## 2024-05-07 RX ORDER — TRAMADOL HYDROCHLORIDE 50 MG/1
50 TABLET ORAL EVERY 8 HOURS PRN
Qty: 60 TABLET | Refills: 0 | Status: SHIPPED | OUTPATIENT
Start: 2024-05-07 | End: 2024-06-06

## 2024-05-22 PROBLEM — G44.209 ACUTE NON INTRACTABLE TENSION-TYPE HEADACHE: Status: ACTIVE | Noted: 2017-06-27

## 2024-05-22 PROBLEM — H01.136 ECZEMA OF EYELID, LEFT: Status: ACTIVE | Noted: 2017-12-12

## 2024-05-22 PROBLEM — M70.61 TROCHANTERIC BURSITIS OF BOTH HIPS: Status: ACTIVE | Noted: 2017-08-01

## 2024-05-22 PROBLEM — E78.00 PURE HYPERCHOLESTEROLEMIA: Status: ACTIVE | Noted: 2017-08-01

## 2024-05-22 PROBLEM — I10 ESSENTIAL HYPERTENSION, BENIGN: Status: RESOLVED | Noted: 2018-03-13 | Resolved: 2024-05-22

## 2024-05-22 PROBLEM — L03.116 CELLULITIS OF LEFT LOWER EXTREMITY: Status: ACTIVE | Noted: 2017-05-15

## 2024-05-22 PROBLEM — K57.92 DIVERTICULITIS: Status: ACTIVE | Noted: 2024-05-22

## 2024-05-22 PROBLEM — I83.90 VARICOSE VEIN OF LEG: Status: ACTIVE | Noted: 2017-08-01

## 2024-05-22 PROBLEM — K60.2 RECTAL FISSURE: Status: ACTIVE | Noted: 2017-11-28

## 2024-05-22 PROBLEM — M70.62 TROCHANTERIC BURSITIS OF BOTH HIPS: Status: ACTIVE | Noted: 2017-08-01

## 2024-05-22 PROBLEM — I10 ESSENTIAL HYPERTENSION, BENIGN: Status: ACTIVE | Noted: 2018-03-13

## 2024-05-22 NOTE — PROGRESS NOTES
[facility]  [address] [place] [address]  Phone: [phone]  Fax: [phone]      Date of Visit:  2024  Patient Name: Becca Perez   Patient :  1955     Assessment:     1. Benign hypertensive heart disease without congestive heart failure    2. Gastroesophageal reflux disease, unspecified whether esophagitis present    3. Impaired fasting glucose    4. Vitamin D deficiency    5. Non-smoker    6. Encounter for screening mammogram for malignant neoplasm of breast    7. Chronic left hip pain        Plan:    BMI was elevated today, and weight loss plan recommended is : conventional weight loss.            There are no Patient Instructions on file for this visit.     Requested Prescriptions     Signed Prescriptions Disp Refills    diclofenac sodium (VOLTAREN) 1 % GEL 4 g 1     Sig: Apply 2 g topically 4 times daily       Medications Discontinued During This Encounter   Medication Reason    ketoconazole (NIZORAL) 2 % cream LIST CLEANUP    Biotin 1000 MCG TABS LIST CLEANUP    Cholecalciferol (VITAMIN D3) 125 MCG (5000 UT) CAPS LIST CLEANUP    NEXIUM 40 MG capsule LIST CLEANUP    sertraline (ZOLOFT) 50 MG tablet LIST CLEANUP    vitamin B-12 (CYANOCOBALAMIN) 1000 MCG tablet LIST CLEANUP       No follow-ups on file.    Becca was given educational materials: See patient instructions. Discussed use, benefit, and side effects of prescribed medications.  Barriers to medication compliance addressed. All patient questions answered.  Pt voiced understanding.     Subjective:      HPI    Becca Perez is a 68 y.o. female with Hx of  has a past medical history of Bronchitis, GERD (gastroesophageal reflux disease), Hay fever, History of diverticula of colon, Hypertension, Migraines, and Obesity. who presents to clinic with due to   Chief Complaint   Patient presents with    Follow-up     HTN         Patient comes in for a recheck.  She had her

## 2024-05-23 ENCOUNTER — OFFICE VISIT (OUTPATIENT)
Age: 69
End: 2024-05-23
Payer: MEDICARE

## 2024-05-23 VITALS
DIASTOLIC BLOOD PRESSURE: 59 MMHG | RESPIRATION RATE: 16 BRPM | HEART RATE: 67 BPM | WEIGHT: 249 LBS | SYSTOLIC BLOOD PRESSURE: 111 MMHG | BODY MASS INDEX: 45.54 KG/M2

## 2024-05-23 DIAGNOSIS — I11.9 BENIGN HYPERTENSIVE HEART DISEASE WITHOUT CONGESTIVE HEART FAILURE: Primary | ICD-10-CM

## 2024-05-23 DIAGNOSIS — G89.29 CHRONIC LEFT HIP PAIN: ICD-10-CM

## 2024-05-23 DIAGNOSIS — R73.01 IMPAIRED FASTING GLUCOSE: ICD-10-CM

## 2024-05-23 DIAGNOSIS — M25.552 CHRONIC LEFT HIP PAIN: ICD-10-CM

## 2024-05-23 DIAGNOSIS — Z12.31 ENCOUNTER FOR SCREENING MAMMOGRAM FOR MALIGNANT NEOPLASM OF BREAST: ICD-10-CM

## 2024-05-23 DIAGNOSIS — E55.9 VITAMIN D DEFICIENCY: ICD-10-CM

## 2024-05-23 DIAGNOSIS — Z78.9 NON-SMOKER: ICD-10-CM

## 2024-05-23 DIAGNOSIS — K21.9 GASTROESOPHAGEAL REFLUX DISEASE, UNSPECIFIED WHETHER ESOPHAGITIS PRESENT: ICD-10-CM

## 2024-05-23 PROCEDURE — 99214 OFFICE O/P EST MOD 30 MIN: CPT | Performed by: FAMILY MEDICINE

## 2024-05-23 PROCEDURE — 1123F ACP DISCUSS/DSCN MKR DOCD: CPT | Performed by: FAMILY MEDICINE

## 2024-05-23 RX ORDER — POLYETHYLENE GLYCOL 3350 17 G/17G
17 POWDER, FOR SOLUTION ORAL DAILY PRN
COMMUNITY

## 2024-05-23 NOTE — PATIENT INSTRUCTIONS
Continue on diet and lifestyle changes., I would continue to recommend a diet focused on vegetables, fruits, health protein that minimizes sweets, watch more than 1 portion of carbohydrates in a meal, avoid high sugar beverages and excess red meats. Maintain a healthy BMI 30-35 good goal for you. Work to a lifestyle of regular a aerobic exercise = 40 minutes mild to moderate physical activity 3-4 times a week.   Keep working on diet and exercise as you have done in the past. A good goal would be to come down about 10 lb in the next 3 months. Also moderate exercise as tolerated.     I will send you a note for labs when I get them or talk in 3 months.

## 2024-07-01 DIAGNOSIS — M16.0 PRIMARY OSTEOARTHRITIS OF BOTH HIPS: ICD-10-CM

## 2024-07-02 RX ORDER — TRAMADOL HYDROCHLORIDE 50 MG/1
50 TABLET ORAL EVERY 8 HOURS PRN
Qty: 60 TABLET | Refills: 0 | Status: SHIPPED | OUTPATIENT
Start: 2024-07-02 | End: 2024-08-01

## 2024-07-05 ENCOUNTER — OFFICE VISIT (OUTPATIENT)
Age: 69
End: 2024-07-05
Payer: MEDICARE

## 2024-07-05 VITALS
SYSTOLIC BLOOD PRESSURE: 133 MMHG | WEIGHT: 241.6 LBS | DIASTOLIC BLOOD PRESSURE: 64 MMHG | RESPIRATION RATE: 12 BRPM | BODY MASS INDEX: 44.19 KG/M2 | HEART RATE: 99 BPM | TEMPERATURE: 100 F

## 2024-07-05 DIAGNOSIS — R05.1 ACUTE COUGH: ICD-10-CM

## 2024-07-05 DIAGNOSIS — R11.2 NAUSEA AND VOMITING, UNSPECIFIED VOMITING TYPE: ICD-10-CM

## 2024-07-05 DIAGNOSIS — J34.89 RHINORRHEA: ICD-10-CM

## 2024-07-05 DIAGNOSIS — J06.9 VIRAL URI: Primary | ICD-10-CM

## 2024-07-05 DIAGNOSIS — K57.90 DIVERTICULOSIS: ICD-10-CM

## 2024-07-05 DIAGNOSIS — Z78.9 NON-SMOKER: ICD-10-CM

## 2024-07-05 DIAGNOSIS — R10.13 EPIGASTRIC PAIN: ICD-10-CM

## 2024-07-05 DIAGNOSIS — R14.2 BELCHING: ICD-10-CM

## 2024-07-05 DIAGNOSIS — K21.9 GASTROESOPHAGEAL REFLUX DISEASE, UNSPECIFIED WHETHER ESOPHAGITIS PRESENT: ICD-10-CM

## 2024-07-05 PROCEDURE — 99214 OFFICE O/P EST MOD 30 MIN: CPT

## 2024-07-05 PROCEDURE — 1123F ACP DISCUSS/DSCN MKR DOCD: CPT

## 2024-07-05 PROCEDURE — 99211 OFF/OP EST MAY X REQ PHY/QHP: CPT

## 2024-07-05 RX ORDER — FAMOTIDINE 20 MG/1
20 TABLET, FILM COATED ORAL 2 TIMES DAILY
Qty: 60 TABLET | Refills: 3 | Status: SHIPPED | OUTPATIENT
Start: 2024-07-05

## 2024-07-05 RX ORDER — ONDANSETRON 4 MG/1
4 TABLET, ORALLY DISINTEGRATING ORAL 3 TIMES DAILY PRN
Qty: 21 TABLET | Refills: 0 | Status: SHIPPED | OUTPATIENT
Start: 2024-07-05

## 2024-07-05 NOTE — PATIENT INSTRUCTIONS
Thank you for coming in today.  It was nice to meet you.    Hope you feel better soon  I believe this is a viral URI with gastritis.  Please be sure to take care of yourself.  Get plenty of sleep  -Take the Pepcid twice daily for the next 2 weeks  -Zofran ordered as needed to help with nausea    -Be sure to drink plenty of fluids to keep well-hydrated, eat a gentle simple meal, take your gentle bowel regimen for diverticulosis    -If your symptoms worsen including high fevers, worsening abdominal pain, lower left abdominal pain, chest pain, shortness of breath or any other concerning symptoms please call the office or go to the emergency department    -If you have questions or concerns please feel free to contact the office by phone or by [employer].

## 2024-07-05 NOTE — PROGRESS NOTES
Problem List   Diagnosis    Rotator cuff tear    Tendinitis of left shoulder    Closed bimalleolar fracture of left ankle    Fracture, phalanx, foot    Sprain of ankle or foot, right    Impingement syndrome of shoulder region    Neuritis of foot    Morbid obesity (HCC)    Non-smoker    Gastroesophageal reflux disease    Vitamin D deficiency    Mixed hyperlipidemia    Diverticulitis    Acute sinusitis    Acute non intractable tension-type headache    Acute serous otitis media    Eczema of both hands    Atypical migraine    Benign hypertensive heart disease without congestive heart failure    Cellulitis of left lower extremity    Disease of bronchus    Diverticulitis of colon    Eczema of eyelid, left    Hamstring strain    Hay fever    Impaired fasting glucose    Mononeuropathy of lower extremity    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    Night sweats    Pure hypercholesterolemia    Rectal fissure    Stiffness of hip joint    Trochanteric bursitis of both hips    Varicose vein of leg       Past Medical History:   Diagnosis Date    Bronchitis     GERD (gastroesophageal reflux disease)     Hay fever     History of diverticula of colon     Hypertension     Migraines     Obesity        Past Surgical History:   Procedure Laterality Date    FOOT SURGERY      HEMORRHOID SURGERY      KNEE ARTHROPLASTY          Social History     Socioeconomic History    Marital status:      Spouse name: None    Number of children: None    Years of education: None    Highest education level: None   Tobacco Use    Smoking status: Former     Current packs/day: 0.00     Average packs/day: 0.5 packs/day for 40.0 years (20.0 ttl pk-yrs)     Types: Cigarettes     Start date:      Quit date: 2000     Years since quittin.1    Smokeless tobacco: Never   Vaping Use    Vaping Use: Never used   Substance and Sexual Activity    Alcohol use: Yes     Comment: socially    Drug use: Never

## 2024-07-10 ENCOUNTER — APPOINTMENT (OUTPATIENT)
Dept: CT IMAGING | Age: 69
End: 2024-07-10
Payer: MEDICARE

## 2024-07-10 ENCOUNTER — TELEPHONE (OUTPATIENT)
Age: 69
End: 2024-07-10

## 2024-07-10 ENCOUNTER — HOSPITAL ENCOUNTER (INPATIENT)
Age: 69
LOS: 3 days | Discharge: HOME | End: 2024-07-13
Attending: EMERGENCY MEDICINE | Admitting: FAMILY MEDICINE
Payer: MEDICARE

## 2024-07-10 ENCOUNTER — APPOINTMENT (OUTPATIENT)
Dept: GENERAL RADIOLOGY | Age: 69
End: 2024-07-10
Payer: MEDICARE

## 2024-07-10 DIAGNOSIS — E87.1 HYPONATREMIA: ICD-10-CM

## 2024-07-10 DIAGNOSIS — R79.89 ELEVATED LFTS: ICD-10-CM

## 2024-07-10 DIAGNOSIS — R19.7 DIARRHEA, UNSPECIFIED TYPE: Primary | ICD-10-CM

## 2024-07-10 DIAGNOSIS — J18.9 COMMUNITY ACQUIRED PNEUMONIA OF RIGHT MIDDLE LOBE OF LUNG: ICD-10-CM

## 2024-07-10 DIAGNOSIS — E87.6 HYPOKALEMIA: ICD-10-CM

## 2024-07-10 DIAGNOSIS — E83.51 HYPOCALCEMIA: ICD-10-CM

## 2024-07-10 PROBLEM — R74.01 TRANSAMINITIS: Status: ACTIVE | Noted: 2024-07-10

## 2024-07-10 PROBLEM — E87.8 HYPOCHLOREMIA: Status: ACTIVE | Noted: 2024-07-10

## 2024-07-10 PROBLEM — D72.825 BANDEMIA: Status: ACTIVE | Noted: 2024-07-10

## 2024-07-10 LAB
ALBUMIN SERPL-MCNC: 3.1 G/DL (ref 3.5–5.2)
ALBUMIN/GLOB SERPL: 0.9 {RATIO} (ref 1–2.5)
ALP SERPL-CCNC: 158 U/L (ref 35–104)
ALT SERPL-CCNC: 232 U/L (ref 5–33)
ANION GAP SERPL CALCULATED.3IONS-SCNC: 11 MMOL/L (ref 9–17)
AST SERPL-CCNC: 319 U/L
BACTERIA URNS QL MICRO: ABNORMAL
BASOPHILS # BLD: 0 K/UL (ref 0–0.2)
BASOPHILS NFR BLD: 0 % (ref 0–2)
BILIRUB SERPL-MCNC: 0.7 MG/DL (ref 0.3–1.2)
BILIRUB UR QL STRIP: NEGATIVE
BNP SERPL-MCNC: 269 PG/ML
BUN SERPL-MCNC: 8 MG/DL (ref 8–23)
CALCIUM SERPL-MCNC: 8.5 MG/DL (ref 8.6–10.4)
CHARACTER UR: ABNORMAL
CHLORIDE SERPL-SCNC: 87 MMOL/L (ref 98–107)
CLARITY UR: CLEAR
CO2 SERPL-SCNC: 26 MMOL/L (ref 20–31)
COLOR UR: YELLOW
CREAT SERPL-MCNC: 0.7 MG/DL (ref 0.5–0.9)
EOSINOPHIL # BLD: 0 K/UL (ref 0–0.4)
EOSINOPHILS RELATIVE PERCENT: 0 % (ref 1–4)
EPI CELLS #/AREA URNS HPF: ABNORMAL /HPF (ref 0–5)
ERYTHROCYTE [DISTWIDTH] IN BLOOD BY AUTOMATED COUNT: 12.8 % (ref 12.5–15.4)
FLUAV AG SPEC QL: NEGATIVE
FLUBV AG SPEC QL: NEGATIVE
GFR, ESTIMATED: >90 ML/MIN/1.73M2
GLUCOSE SERPL-MCNC: 109 MG/DL (ref 70–99)
GLUCOSE UR STRIP-MCNC: NEGATIVE MG/DL
HCT VFR BLD AUTO: 36.9 % (ref 36–46)
HGB BLD-MCNC: 12.4 G/DL (ref 12–16)
HGB UR QL STRIP.AUTO: ABNORMAL
INR PPP: 1
KETONES UR STRIP-MCNC: ABNORMAL MG/DL
LACTATE BLDV-SCNC: 1.2 MMOL/L (ref 0.5–1.9)
LEUKOCYTE ESTERASE UR QL STRIP: NEGATIVE
LIPASE SERPL-CCNC: 52 U/L (ref 13–60)
LYMPHOCYTES NFR BLD: 1.24 K/UL (ref 1–4.8)
LYMPHOCYTES RELATIVE PERCENT: 10 % (ref 24–44)
MAGNESIUM SERPL-MCNC: 2.1 MG/DL (ref 1.6–2.6)
MCH RBC QN AUTO: 28.9 PG (ref 26–34)
MCHC RBC AUTO-ENTMCNC: 33.7 G/DL (ref 31–37)
MCV RBC AUTO: 85.6 FL (ref 80–100)
MONOCYTES NFR BLD: 0.25 K/UL (ref 0.1–0.8)
MONOCYTES NFR BLD: 2 % (ref 1–7)
MORPHOLOGY: NORMAL
NEUTROPHILS NFR BLD: 88 % (ref 36–66)
NEUTS SEG NFR BLD: 10.91 K/UL (ref 1.8–7.7)
NITRITE UR QL STRIP: NEGATIVE
PARTIAL THROMBOPLASTIN TIME: 27 SEC (ref 21.3–31.3)
PH UR STRIP: 6 [PH] (ref 5–8)
PLATELET # BLD AUTO: 305 K/UL (ref 140–450)
PMV BLD AUTO: 8.1 FL (ref 6–12)
POTASSIUM SERPL-SCNC: 3.5 MMOL/L (ref 3.7–5.3)
PROT SERPL-MCNC: 6.4 G/DL (ref 6.4–8.3)
PROT UR STRIP-MCNC: ABNORMAL MG/DL
PROTHROMBIN TIME: 10.4 SEC (ref 9.4–12.6)
RBC # BLD AUTO: 4.31 M/UL (ref 4–5.2)
RBC #/AREA URNS HPF: ABNORMAL /HPF (ref 0–2)
SARS-COV-2 RDRP RESP QL NAA+PROBE: NOT DETECTED
SODIUM SERPL-SCNC: 124 MMOL/L (ref 135–144)
SP GR UR STRIP: 1.01 (ref 1–1.03)
SPECIMEN DESCRIPTION: NORMAL
TROPONIN I SERPL HS-MCNC: 8 NG/L (ref 0–14)
UROBILINOGEN UR STRIP-ACNC: ABNORMAL EU/DL (ref 0–1)
WBC #/AREA URNS HPF: ABNORMAL /HPF (ref 0–5)
WBC OTHER # BLD: 12.4 K/UL (ref 3.5–11)

## 2024-07-10 PROCEDURE — 83735 ASSAY OF MAGNESIUM: CPT

## 2024-07-10 PROCEDURE — 93005 ELECTROCARDIOGRAM TRACING: CPT | Performed by: EMERGENCY MEDICINE

## 2024-07-10 PROCEDURE — 2580000003 HC RX 258: Performed by: FAMILY MEDICINE

## 2024-07-10 PROCEDURE — 87040 BLOOD CULTURE FOR BACTERIA: CPT

## 2024-07-10 PROCEDURE — 2580000003 HC RX 258: Performed by: EMERGENCY MEDICINE

## 2024-07-10 PROCEDURE — 85025 COMPLETE CBC W/AUTO DIFF WBC: CPT

## 2024-07-10 PROCEDURE — 87804 INFLUENZA ASSAY W/OPTIC: CPT

## 2024-07-10 PROCEDURE — 2060000000 HC ICU INTERMEDIATE R&B

## 2024-07-10 PROCEDURE — 80053 COMPREHEN METABOLIC PANEL: CPT

## 2024-07-10 PROCEDURE — 83880 ASSAY OF NATRIURETIC PEPTIDE: CPT

## 2024-07-10 PROCEDURE — 99285 EMERGENCY DEPT VISIT HI MDM: CPT

## 2024-07-10 PROCEDURE — 71045 X-RAY EXAM CHEST 1 VIEW: CPT

## 2024-07-10 PROCEDURE — 81001 URINALYSIS AUTO W/SCOPE: CPT

## 2024-07-10 PROCEDURE — 85730 THROMBOPLASTIN TIME PARTIAL: CPT

## 2024-07-10 PROCEDURE — 6360000004 HC RX CONTRAST MEDICATION: Performed by: FAMILY MEDICINE

## 2024-07-10 PROCEDURE — 83690 ASSAY OF LIPASE: CPT

## 2024-07-10 PROCEDURE — 74177 CT ABD & PELVIS W/CONTRAST: CPT

## 2024-07-10 PROCEDURE — 84484 ASSAY OF TROPONIN QUANT: CPT

## 2024-07-10 PROCEDURE — 85610 PROTHROMBIN TIME: CPT

## 2024-07-10 PROCEDURE — 83605 ASSAY OF LACTIC ACID: CPT

## 2024-07-10 PROCEDURE — 36415 COLL VENOUS BLD VENIPUNCTURE: CPT

## 2024-07-10 PROCEDURE — 87635 SARS-COV-2 COVID-19 AMP PRB: CPT

## 2024-07-10 RX ORDER — 0.9 % SODIUM CHLORIDE 0.9 %
100 INTRAVENOUS SOLUTION INTRAVENOUS ONCE
Status: DISCONTINUED | OUTPATIENT
Start: 2024-07-11 | End: 2024-07-13 | Stop reason: HOSPADM

## 2024-07-10 RX ORDER — TRAMADOL HYDROCHLORIDE 50 MG/1
50 TABLET ORAL EVERY 8 HOURS PRN
Status: DISCONTINUED | OUTPATIENT
Start: 2024-07-10 | End: 2024-07-13 | Stop reason: HOSPADM

## 2024-07-10 RX ORDER — LANOLIN ALCOHOL/MO/W.PET/CERES
3 CREAM (GRAM) TOPICAL NIGHTLY
Status: DISCONTINUED | OUTPATIENT
Start: 2024-07-10 | End: 2024-07-13 | Stop reason: HOSPADM

## 2024-07-10 RX ORDER — METOPROLOL SUCCINATE 50 MG/1
50 TABLET, EXTENDED RELEASE ORAL NIGHTLY
Status: DISCONTINUED | OUTPATIENT
Start: 2024-07-10 | End: 2024-07-13 | Stop reason: HOSPADM

## 2024-07-10 RX ORDER — 0.9 % SODIUM CHLORIDE 0.9 %
1000 INTRAVENOUS SOLUTION INTRAVENOUS ONCE
Status: COMPLETED | OUTPATIENT
Start: 2024-07-10 | End: 2024-07-10

## 2024-07-10 RX ORDER — FAMOTIDINE 20 MG/1
20 TABLET, FILM COATED ORAL 2 TIMES DAILY
Status: DISCONTINUED | OUTPATIENT
Start: 2024-07-10 | End: 2024-07-13 | Stop reason: HOSPADM

## 2024-07-10 RX ORDER — SODIUM CHLORIDE 0.9 % (FLUSH) 0.9 %
5-40 SYRINGE (ML) INJECTION PRN
Status: DISCONTINUED | OUTPATIENT
Start: 2024-07-10 | End: 2024-07-13 | Stop reason: HOSPADM

## 2024-07-10 RX ORDER — CETIRIZINE HYDROCHLORIDE 10 MG/1
10 TABLET ORAL DAILY
Status: DISCONTINUED | OUTPATIENT
Start: 2024-07-11 | End: 2024-07-13 | Stop reason: HOSPADM

## 2024-07-10 RX ADMIN — IOPAMIDOL 75 ML: 755 INJECTION, SOLUTION INTRAVENOUS at 23:57

## 2024-07-10 RX ADMIN — SODIUM CHLORIDE 1000 ML: 9 INJECTION, SOLUTION INTRAVENOUS at 21:52

## 2024-07-10 RX ADMIN — SODIUM CHLORIDE, PRESERVATIVE FREE 10 ML: 5 INJECTION INTRAVENOUS at 23:57

## 2024-07-10 RX ADMIN — Medication 80 ML: at 23:57

## 2024-07-10 ASSESSMENT — ENCOUNTER SYMPTOMS
BACK PAIN: 0
NAUSEA: 1
ABDOMINAL PAIN: 1
RHINORRHEA: 0
CONSTIPATION: 0
DIARRHEA: 1
SORE THROAT: 0
SHORTNESS OF BREATH: 0
BLOOD IN STOOL: 0
EYE PAIN: 0
COUGH: 0
VOMITING: 1

## 2024-07-10 ASSESSMENT — PAIN - FUNCTIONAL ASSESSMENT: PAIN_FUNCTIONAL_ASSESSMENT: 0-10

## 2024-07-10 ASSESSMENT — PAIN DESCRIPTION - LOCATION: LOCATION: ABDOMEN

## 2024-07-10 ASSESSMENT — PAIN SCALES - GENERAL: PAINLEVEL_OUTOF10: 8

## 2024-07-10 ASSESSMENT — PAIN DESCRIPTION - PAIN TYPE: TYPE: ACUTE PAIN

## 2024-07-10 ASSESSMENT — PAIN DESCRIPTION - DESCRIPTORS: DESCRIPTORS: BURNING

## 2024-07-10 NOTE — TELEPHONE ENCOUNTER
Please call patient and ask her to have a COVID test done since she is having high fevers, if she does not have COVID please have her schedule appointment to be reevaluated in the morning.  If she feels she cannot wait until the morning she can go to the urgent care or emergency department.   Thank you

## 2024-07-10 NOTE — TELEPHONE ENCOUNTER
Pt says she tested one week ago before being seen, Dr Dumont would like her to test again tonight and if is negative call in am for appt, if she gets worse she can go to Urgent Care or  ER tonight..the patient agreeable-

## 2024-07-10 NOTE — TELEPHONE ENCOUNTER
I called patient and she spoke to the  Already and verb understanding of plan of care and will get tested as instructed and go to ER if not better

## 2024-07-10 NOTE — TELEPHONE ENCOUNTER
Patient called and stated that she isn't feeling any better. She wanted to know if she can have something for her cough because she stated that she coughs so much and so hard that it makes her vomit. Also she stated that she is very weak.

## 2024-07-10 NOTE — TELEPHONE ENCOUNTER
Says fever off and on highest is 102, will take tylenol, barely eating since so nauseated but is drinking and getting fluids in...Says is so weak-  What next??

## 2024-07-11 ENCOUNTER — APPOINTMENT (OUTPATIENT)
Dept: ULTRASOUND IMAGING | Age: 69
End: 2024-07-11
Payer: MEDICARE

## 2024-07-11 PROBLEM — K57.30 DIVERTICULOSIS LARGE INTESTINE W/O PERFORATION OR ABSCESS W/O BLEEDING: Status: ACTIVE | Noted: 2024-07-11

## 2024-07-11 PROBLEM — D64.9 ANEMIA: Status: ACTIVE | Noted: 2024-07-11

## 2024-07-11 PROBLEM — R19.7 DIARRHEA: Status: ACTIVE | Noted: 2024-07-11

## 2024-07-11 PROBLEM — R79.89 ELEVATED LFTS: Status: ACTIVE | Noted: 2024-07-11

## 2024-07-11 LAB
ALBUMIN SERPL-MCNC: 2.9 G/DL (ref 3.5–5.2)
ALBUMIN/GLOB SERPL: 1 {RATIO} (ref 1–2.5)
ALP SERPL-CCNC: 135 U/L (ref 35–104)
ALT SERPL-CCNC: 192 U/L (ref 5–33)
ANION GAP SERPL CALCULATED.3IONS-SCNC: 9 MMOL/L (ref 9–17)
AST SERPL-CCNC: 217 U/L
B PARAP IS1001 DNA NPH QL NAA+NON-PROBE: NOT DETECTED
B PERT DNA SPEC QL NAA+PROBE: NOT DETECTED
BASOPHILS # BLD: 0 K/UL (ref 0–0.2)
BASOPHILS NFR BLD: 0 % (ref 0–2)
BILIRUB SERPL-MCNC: 0.5 MG/DL (ref 0.3–1.2)
BUN SERPL-MCNC: 7 MG/DL (ref 8–23)
C PNEUM DNA NPH QL NAA+NON-PROBE: NOT DETECTED
CALCIUM SERPL-MCNC: 8.1 MG/DL (ref 8.6–10.4)
CHLORIDE SERPL-SCNC: 95 MMOL/L (ref 98–107)
CO2 SERPL-SCNC: 24 MMOL/L (ref 20–31)
CREAT SERPL-MCNC: 0.6 MG/DL (ref 0.5–0.9)
EKG ATRIAL RATE: 95 BPM
EKG P AXIS: 28 DEGREES
EKG P-R INTERVAL: 172 MS
EKG Q-T INTERVAL: 342 MS
EKG QRS DURATION: 110 MS
EKG QTC CALCULATION (BAZETT): 429 MS
EKG R AXIS: -45 DEGREES
EKG T AXIS: 61 DEGREES
EKG VENTRICULAR RATE: 95 BPM
EOSINOPHIL # BLD: 0.24 K/UL (ref 0–0.4)
EOSINOPHILS RELATIVE PERCENT: 2 % (ref 1–4)
ERYTHROCYTE [DISTWIDTH] IN BLOOD BY AUTOMATED COUNT: 13 % (ref 12.5–15.4)
FLUAV RNA NPH QL NAA+NON-PROBE: NOT DETECTED
FLUBV RNA NPH QL NAA+NON-PROBE: NOT DETECTED
GFR, ESTIMATED: >90 ML/MIN/1.73M2
GLUCOSE SERPL-MCNC: 117 MG/DL (ref 70–99)
HADV DNA NPH QL NAA+NON-PROBE: NOT DETECTED
HCOV 229E RNA NPH QL NAA+NON-PROBE: NOT DETECTED
HCOV HKU1 RNA NPH QL NAA+NON-PROBE: NOT DETECTED
HCOV NL63 RNA NPH QL NAA+NON-PROBE: NOT DETECTED
HCOV OC43 RNA NPH QL NAA+NON-PROBE: NOT DETECTED
HCT VFR BLD AUTO: 35.1 % (ref 36–46)
HGB BLD-MCNC: 11.9 G/DL (ref 12–16)
HMPV RNA NPH QL NAA+NON-PROBE: NOT DETECTED
HPIV1 RNA NPH QL NAA+NON-PROBE: NOT DETECTED
HPIV2 RNA NPH QL NAA+NON-PROBE: NOT DETECTED
HPIV3 RNA NPH QL NAA+NON-PROBE: NOT DETECTED
HPIV4 RNA NPH QL NAA+NON-PROBE: NOT DETECTED
L PNEUMO1 AG UR QL IA.RAPID: NEGATIVE
LYMPHOCYTES NFR BLD: 2.24 K/UL (ref 1–4.8)
LYMPHOCYTES RELATIVE PERCENT: 19 % (ref 24–44)
M PNEUMO DNA NPH QL NAA+NON-PROBE: NOT DETECTED
MCH RBC QN AUTO: 29 PG (ref 26–34)
MCHC RBC AUTO-ENTMCNC: 34 G/DL (ref 31–37)
MCV RBC AUTO: 85.5 FL (ref 80–100)
MONOCYTES NFR BLD: 1.06 K/UL (ref 0.1–0.8)
MONOCYTES NFR BLD: 9 % (ref 1–7)
MORPHOLOGY: NORMAL
NEUTROPHILS NFR BLD: 70 % (ref 36–66)
NEUTS SEG NFR BLD: 8.26 K/UL (ref 1.8–7.7)
PLATELET # BLD AUTO: 295 K/UL (ref 140–450)
PMV BLD AUTO: 8.1 FL (ref 6–12)
POTASSIUM SERPL-SCNC: 3.8 MMOL/L (ref 3.7–5.3)
PROT SERPL-MCNC: 5.9 G/DL (ref 6.4–8.3)
RBC # BLD AUTO: 4.11 M/UL (ref 4–5.2)
RSV RNA NPH QL NAA+NON-PROBE: NOT DETECTED
RV+EV RNA NPH QL NAA+NON-PROBE: NOT DETECTED
SARS-COV-2 RNA NPH QL NAA+NON-PROBE: NOT DETECTED
SODIUM SERPL-SCNC: 128 MMOL/L (ref 135–144)
SPECIMEN DESCRIPTION: NORMAL
TROPONIN I SERPL HS-MCNC: 8 NG/L (ref 0–14)
WBC OTHER # BLD: 11.8 K/UL (ref 3.5–11)

## 2024-07-11 PROCEDURE — 6370000000 HC RX 637 (ALT 250 FOR IP)

## 2024-07-11 PROCEDURE — 94761 N-INVAS EAR/PLS OXIMETRY MLT: CPT

## 2024-07-11 PROCEDURE — 94667 MNPJ CHEST WALL 1ST: CPT

## 2024-07-11 PROCEDURE — 36415 COLL VENOUS BLD VENIPUNCTURE: CPT

## 2024-07-11 PROCEDURE — 6370000000 HC RX 637 (ALT 250 FOR IP): Performed by: FAMILY MEDICINE

## 2024-07-11 PROCEDURE — 80053 COMPREHEN METABOLIC PANEL: CPT

## 2024-07-11 PROCEDURE — 94640 AIRWAY INHALATION TREATMENT: CPT

## 2024-07-11 PROCEDURE — 2060000000 HC ICU INTERMEDIATE R&B

## 2024-07-11 PROCEDURE — 2700000000 HC OXYGEN THERAPY PER DAY

## 2024-07-11 PROCEDURE — 0202U NFCT DS 22 TRGT SARS-COV-2: CPT

## 2024-07-11 PROCEDURE — 85025 COMPLETE CBC W/AUTO DIFF WBC: CPT

## 2024-07-11 PROCEDURE — 84484 ASSAY OF TROPONIN QUANT: CPT

## 2024-07-11 PROCEDURE — 76705 ECHO EXAM OF ABDOMEN: CPT

## 2024-07-11 PROCEDURE — 99222 1ST HOSP IP/OBS MODERATE 55: CPT | Performed by: FAMILY MEDICINE

## 2024-07-11 PROCEDURE — 2580000003 HC RX 258

## 2024-07-11 PROCEDURE — 87449 NOS EACH ORGANISM AG IA: CPT

## 2024-07-11 PROCEDURE — 6360000002 HC RX W HCPCS

## 2024-07-11 RX ORDER — AZITHROMYCIN 250 MG/1
500 TABLET, FILM COATED ORAL EVERY 24 HOURS
Status: DISCONTINUED | OUTPATIENT
Start: 2024-07-11 | End: 2024-07-11

## 2024-07-11 RX ORDER — SODIUM CHLORIDE 0.9 % (FLUSH) 0.9 %
5-40 SYRINGE (ML) INJECTION EVERY 12 HOURS SCHEDULED
Status: DISCONTINUED | OUTPATIENT
Start: 2024-07-11 | End: 2024-07-13 | Stop reason: HOSPADM

## 2024-07-11 RX ORDER — ACETAMINOPHEN 325 MG/1
650 TABLET ORAL EVERY 6 HOURS PRN
Status: DISCONTINUED | OUTPATIENT
Start: 2024-07-11 | End: 2024-07-13 | Stop reason: HOSPADM

## 2024-07-11 RX ORDER — ONDANSETRON 4 MG/1
4 TABLET, ORALLY DISINTEGRATING ORAL EVERY 8 HOURS PRN
Status: DISCONTINUED | OUTPATIENT
Start: 2024-07-11 | End: 2024-07-13 | Stop reason: HOSPADM

## 2024-07-11 RX ORDER — POLYETHYLENE GLYCOL 3350 17 G/17G
17 POWDER, FOR SOLUTION ORAL DAILY PRN
Status: DISCONTINUED | OUTPATIENT
Start: 2024-07-11 | End: 2024-07-13 | Stop reason: HOSPADM

## 2024-07-11 RX ORDER — SODIUM CHLORIDE 0.9 % (FLUSH) 0.9 %
5-40 SYRINGE (ML) INJECTION PRN
Status: DISCONTINUED | OUTPATIENT
Start: 2024-07-11 | End: 2024-07-13 | Stop reason: HOSPADM

## 2024-07-11 RX ORDER — SODIUM CHLORIDE 9 MG/ML
INJECTION, SOLUTION INTRAVENOUS PRN
Status: DISCONTINUED | OUTPATIENT
Start: 2024-07-11 | End: 2024-07-13 | Stop reason: HOSPADM

## 2024-07-11 RX ORDER — ONDANSETRON 2 MG/ML
4 INJECTION INTRAMUSCULAR; INTRAVENOUS EVERY 6 HOURS PRN
Status: DISCONTINUED | OUTPATIENT
Start: 2024-07-11 | End: 2024-07-13 | Stop reason: HOSPADM

## 2024-07-11 RX ORDER — MAGNESIUM SULFATE IN WATER 40 MG/ML
2000 INJECTION, SOLUTION INTRAVENOUS PRN
Status: DISCONTINUED | OUTPATIENT
Start: 2024-07-11 | End: 2024-07-13 | Stop reason: HOSPADM

## 2024-07-11 RX ORDER — ACETAMINOPHEN 650 MG/1
650 SUPPOSITORY RECTAL EVERY 6 HOURS PRN
Status: DISCONTINUED | OUTPATIENT
Start: 2024-07-11 | End: 2024-07-13 | Stop reason: HOSPADM

## 2024-07-11 RX ORDER — SODIUM CHLORIDE 9 MG/ML
INJECTION, SOLUTION INTRAVENOUS CONTINUOUS
Status: DISCONTINUED | OUTPATIENT
Start: 2024-07-11 | End: 2024-07-12

## 2024-07-11 RX ORDER — POTASSIUM CHLORIDE 20 MEQ/1
40 TABLET, EXTENDED RELEASE ORAL PRN
Status: DISCONTINUED | OUTPATIENT
Start: 2024-07-11 | End: 2024-07-13 | Stop reason: HOSPADM

## 2024-07-11 RX ORDER — POTASSIUM CHLORIDE 7.45 MG/ML
10 INJECTION INTRAVENOUS PRN
Status: DISCONTINUED | OUTPATIENT
Start: 2024-07-11 | End: 2024-07-13 | Stop reason: HOSPADM

## 2024-07-11 RX ORDER — IPRATROPIUM BROMIDE AND ALBUTEROL SULFATE 2.5; .5 MG/3ML; MG/3ML
1 SOLUTION RESPIRATORY (INHALATION) EVERY 4 HOURS PRN
Status: DISCONTINUED | OUTPATIENT
Start: 2024-07-11 | End: 2024-07-13 | Stop reason: HOSPADM

## 2024-07-11 RX ORDER — IPRATROPIUM BROMIDE AND ALBUTEROL SULFATE 2.5; .5 MG/3ML; MG/3ML
1 SOLUTION RESPIRATORY (INHALATION)
Status: DISCONTINUED | OUTPATIENT
Start: 2024-07-11 | End: 2024-07-11

## 2024-07-11 RX ADMIN — METOPROLOL SUCCINATE 50 MG: 50 TABLET, EXTENDED RELEASE ORAL at 00:32

## 2024-07-11 RX ADMIN — FAMOTIDINE 20 MG: 20 TABLET ORAL at 00:33

## 2024-07-11 RX ADMIN — SODIUM CHLORIDE: 9 INJECTION, SOLUTION INTRAVENOUS at 00:29

## 2024-07-11 RX ADMIN — FAMOTIDINE 20 MG: 20 TABLET ORAL at 21:10

## 2024-07-11 RX ADMIN — IPRATROPIUM BROMIDE AND ALBUTEROL SULFATE 1 DOSE: .5; 2.5 SOLUTION RESPIRATORY (INHALATION) at 07:41

## 2024-07-11 RX ADMIN — TRAMADOL HYDROCHLORIDE 50 MG: 50 TABLET ORAL at 00:33

## 2024-07-11 RX ADMIN — AZITHROMYCIN DIHYDRATE 500 MG: 250 TABLET, FILM COATED ORAL at 07:14

## 2024-07-11 RX ADMIN — SODIUM CHLORIDE, PRESERVATIVE FREE 10 ML: 5 INJECTION INTRAVENOUS at 21:10

## 2024-07-11 RX ADMIN — CETIRIZINE HYDROCHLORIDE 10 MG: 10 TABLET, FILM COATED ORAL at 07:14

## 2024-07-11 RX ADMIN — CEFTRIAXONE SODIUM 1000 MG: 1 INJECTION, POWDER, FOR SOLUTION INTRAMUSCULAR; INTRAVENOUS at 07:14

## 2024-07-11 RX ADMIN — ONDANSETRON 4 MG: 2 INJECTION INTRAMUSCULAR; INTRAVENOUS at 00:45

## 2024-07-11 RX ADMIN — IPRATROPIUM BROMIDE AND ALBUTEROL SULFATE 1 DOSE: .5; 2.5 SOLUTION RESPIRATORY (INHALATION) at 14:56

## 2024-07-11 RX ADMIN — FAMOTIDINE 20 MG: 20 TABLET ORAL at 07:14

## 2024-07-11 RX ADMIN — METOPROLOL SUCCINATE 50 MG: 50 TABLET, EXTENDED RELEASE ORAL at 21:10

## 2024-07-11 RX ADMIN — Medication 3 MG: at 00:33

## 2024-07-11 RX ADMIN — Medication 3 MG: at 21:10

## 2024-07-11 ASSESSMENT — PAIN - FUNCTIONAL ASSESSMENT
PAIN_FUNCTIONAL_ASSESSMENT: NONE - DENIES PAIN
PAIN_FUNCTIONAL_ASSESSMENT: NONE - DENIES PAIN

## 2024-07-11 ASSESSMENT — ENCOUNTER SYMPTOMS
CONSTIPATION: 0
TROUBLE SWALLOWING: 0
CHEST TIGHTNESS: 0
COUGH: 1
SORE THROAT: 0
BACK PAIN: 0
VOMITING: 1
PHOTOPHOBIA: 0
NAUSEA: 1
BLOOD IN STOOL: 0
RHINORRHEA: 1
ABDOMINAL PAIN: 1
DIARRHEA: 1
SHORTNESS OF BREATH: 0

## 2024-07-11 NOTE — H&P
SURGERY      HEMORRHOID SURGERY      KNEE ARTHROPLASTY          Medications Prior to Admission:     Prior to Admission medications    Medication Sig Start Date End Date Taking? Authorizing Provider   ondansetron (ZOFRAN-ODT) 4 MG disintegrating tablet Take 1 tablet by mouth 3 times daily as needed for Nausea or Vomiting 7/5/24   Eulalia Dumont MD   famotidine (PEPCID) 20 MG tablet Take 1 tablet by mouth 2 times daily 7/5/24   Eulalia Dumont MD   traMADol (ULTRAM) 50 MG tablet Take 1 tablet by mouth every 8 hours as needed for Pain for up to 30 days. Intended supply: 3 days. Take lowest dose possible to manage pain Max Daily Amount: 150 mg 7/2/24 8/1/24  Neo Ferrer MD   polyethylene glycol (GLYCOLAX) 17 g packet Take 1 packet by mouth daily as needed for Constipation 1/2 dose daily    Jillian Dotson MD   diclofenac sodium (VOLTAREN) 1 % GEL Apply 2 g topically 4 times daily 5/23/24   Neo Ferrer MD   Melatonin 10 MG TABS Take by mouth Takes 2 5mg per night    Jillian Dotson MD   Probiotic Product (PROBIOTIC BLEND PO) Take by mouth For women  daily    Jillian Dotson MD   Misc Natural Products (JOINT SUPPORT PO) Take by mouth Mood Free Joint  daily    Jillian Dotson MD   Famotidine-Ca Carb-Mag Hydrox (PEPCID COMPLETE) -165 MG CHEW Take 1 tablet by mouth as needed (for heartburn)    Jillian Dotson MD   cetirizine (ZYRTEC) 10 MG tablet Take 1 tablet by mouth daily    Jillian Dotson MD   Acetaminophen (TYLENOL EXTRA STRENGTH PO) Take by mouth On  per day with tramadol    Jillian Dotson MD   Ascorbic Acid (VITAMIN C PO) Take by mouth Zinc, Vitamin D, combo    Jillian Dotson MD   Calcium Carbonate Antacid (TUMS E-X PO) Take by mouth As needed  Patient not taking: Reported on 7/5/2024    Jillian Dotson MD   COLLAGEN PO Take by mouth In morning coffee    Jillian Dotson MD   metoprolol succinate (TOPROL XL) 50 MG extended release tablet TAKE 1  23 mg/dL    Creatinine 0.7 0.5 - 0.9 mg/dL    Est, Glom Filt Rate >90 >60 mL/min/1.73m2    Calcium 8.5 (L) 8.6 - 10.4 mg/dL    Total Protein 6.4 6.4 - 8.3 g/dL    Albumin 3.1 (L) 3.5 - 5.2 g/dL    Albumin/Globulin Ratio 0.9 (L) 1.0 - 2.5    Total Bilirubin 0.7 0.3 - 1.2 mg/dL    Alkaline Phosphatase 158 (H) 35 - 104 U/L     (H) 5 - 33 U/L     (H) <32 U/L   CBC with Auto Differential    Collection Time: 07/10/24  9:42 PM   Result Value Ref Range    WBC 12.4 (H) 3.5 - 11.0 k/uL    RBC 4.31 4.0 - 5.2 m/uL    Hemoglobin 12.4 12.0 - 16.0 g/dL    Hematocrit 36.9 36 - 46 %    MCV 85.6 80 - 100 fL    MCH 28.9 26 - 34 pg    MCHC 33.7 31 - 37 g/dL    RDW 12.8 12.5 - 15.4 %    Platelets 305 140 - 450 k/uL    MPV 8.1 6.0 - 12.0 fL    Neutrophils % 88 (H) 36 - 66 %    Lymphocytes % 10 (L) 24 - 44 %    Monocytes % 2 1 - 7 %    Eosinophils % 0 (L) 1 - 4 %    Basophils % 0 0 - 2 %    Neutrophils Absolute 10.91 (H) 1.8 - 7.7 k/uL    Lymphocytes Absolute 1.24 1.0 - 4.8 k/uL    Monocytes Absolute 0.25 0.1 - 0.8 k/uL    Eosinophils Absolute 0.00 0.0 - 0.4 k/uL    Basophils Absolute 0.00 0.0 - 0.2 k/uL    Morphology Normal    Lipase    Collection Time: 07/10/24  9:42 PM   Result Value Ref Range    Lipase 52 13 - 60 U/L   Magnesium    Collection Time: 07/10/24  9:42 PM   Result Value Ref Range    Magnesium 2.1 1.6 - 2.6 mg/dL   Lactate, Sepsis    Collection Time: 07/10/24  9:42 PM   Result Value Ref Range    Lactic Acid, Sepsis 1.2 0.5 - 1.9 mmol/L   Troponin    Collection Time: 07/10/24  9:42 PM   Result Value Ref Range    Troponin, High Sensitivity 8 0 - 14 ng/L   APTT    Collection Time: 07/10/24  9:42 PM   Result Value Ref Range    APTT 27.0 21.3 - 31.3 sec   Protime-INR    Collection Time: 07/10/24  9:42 PM   Result Value Ref Range    Protime 10.4 9.4 - 12.6 sec    INR 1.0    Brain Natriuretic Peptide    Collection Time: 07/10/24  9:42 PM   Result Value Ref Range    Pro- <300 pg/mL   COVID-19, Rapid

## 2024-07-11 NOTE — PROGRESS NOTES
[facility]  Inpatient Service     Progress Note  2024    9:36 AM    Name:   Becca Perez  MRN:     [id number]     Acct:      [id number]   Room:   2TRAUMA/2TRAUMA  IP Day:  1  Admit Date:  7/10/2024  9:09 PM    PCP:   Neo Ferrer MD  Code Status:  Full Code    Subjective:     Overnight events: No significant overnight events    Pt was examined at bedside this morning in no apparent cardiopulmonary distress. She endorses some cough (says it is painful when she coughs). Also endorses some lightheadedness, dizziness, and epigastric pain. Denies any chest pain, dysuria, hematuria.     Medications:     Allergies:    Allergies   Allergen Reactions    Aspirin Nausea And Vomiting       Current Meds:   Scheduled Meds:    sodium chloride flush  5-40 mL IntraVENous 2 times per day    ipratropium 0.5 mg-albuterol 2.5 mg  1 Dose Inhalation Q4H WA RT    cefTRIAXone (ROCEPHIN) IV  1,000 mg IntraVENous Q24H    And    azithromycin  500 mg Oral Q24H    cetirizine  10 mg Oral Daily    famotidine  20 mg Oral BID    melatonin  3 mg Oral Nightly    metoprolol succinate  50 mg Oral Nightly    sodium chloride  100 mL IntraVENous Once     Continuous Infusions:    sodium chloride      sodium chloride 150 mL/hr at 24 0029     PRN Meds: sodium chloride flush, sodium chloride, ondansetron **OR** ondansetron, polyethylene glycol, acetaminophen **OR** acetaminophen, potassium chloride **OR** potassium alternative oral replacement **OR** potassium chloride, magnesium sulfate, sodium phosphate 15 mmol in sodium chloride 0.9 % 250 mL IVPB, traMADol, sodium chloride flush    ROS:   ROS is negative except for points noted above.    Data:     Vitals:  /61   Pulse 78   Temp 99 °F (37.2 °C) (Oral)   Resp 24   Ht 1.575 m (5' 2\")   Wt 106.6 kg (235 lb)   SpO2 94%   BMI 42.98 kg/m²   Temp (24hrs), Av.3 °F (37.4 °C), Min:99 °F (37.2 °C), Max:99.5 °F (37.5 °C)    No  pain      Anticoagulation: Intermittent Pneumatic Compression  Dispo: Home  Diet: ADULT DIET; Regular    Patient was seen, evaluated and discussed with Neo Sandy MD Syed Waliullah Hussaini, MD  Family Medicine Resident, PGY-1   7/11/2024  9:36 AM      Senior Resident Attestation:    I have independently seen Becca Perez and discussed the assessment and plan with the intern listed above and the attending Neo Sandy MD. I have reviewed the note and have included any edits or additional information above.     Gatito Livingston DO  Family Medicine Resident, PGY-2   7/11/2024  5:59 PM

## 2024-07-11 NOTE — ED PROVIDER NOTES
[facility] Emergency Department  [address]  [place] [address]  Phone: [phone]  Fax: [phone]    EMERGENCY DEPARTMENT ENCOUNTER          Pt Name: Becca Perez  MRN: [id number]  Birthdate 1955  Date of evaluation: 7/10/2024      CHIEF COMPLAINT       Chief Complaint   Patient presents with    Diarrhea     N/v/d, fevers since July 1st; dizziness, falling X 3 days; saw [facility] 5 July, dx gastroenteritis, symptoms persist since       HISTORY OF PRESENT ILLNESS       Becca Perez is a 68 y.o. female who presents with complaints.  Primarily she is feeling unwell secondary to cough that began July 5.  Saw her PCP at that time and diagnosed with a viral syndrome.  She also also been having nausea, vomiting and diarrhea since that time as well.  Initially she thought she may have diverticulitis but that seems to have improved.  Her pain is primarily in the upper abdomen.  Has had EGDs and colonoscopies numerous times in the past but not recently.  Nothing otherwise makes the symptoms better or worse.  She does report some subjective fevers.  She is on Zofran per her PCP.  She took a home COVID test that was negative today.  Denies any blood in the stool or vomit.  No other symptoms or concerns at this time.  No recent surgical procedures    REVIEW OF SYSTEMS       Review of Systems   Constitutional:  Negative for chills, fatigue and fever.   HENT:  Negative for rhinorrhea and sore throat.    Eyes:  Negative for pain.   Respiratory:  Negative for cough and shortness of breath.    Cardiovascular:  Negative for chest pain.   Gastrointestinal:  Positive for abdominal pain, diarrhea, nausea and vomiting. Negative for blood in stool and constipation.   Genitourinary:  Negative for difficulty urinating and dysuria.   Musculoskeletal:  Negative for back pain and neck pain.   Skin:  Negative for rash.   Neurological:  Negative for weakness and headaches.        PAST MEDICAL

## 2024-07-11 NOTE — PLAN OF CARE
Problem: Safety - Adult  Goal: Free from fall injury  Outcome: Progressing     Problem: Discharge Planning  Goal: Discharge to home or other facility with appropriate resources  Outcome: Progressing  Flowsheets (Taken 7/11/2024 7087)  Discharge to home or other facility with appropriate resources: Identify barriers to discharge with patient and caregiver     Problem: Gastrointestinal - Adult  Goal: Minimal or absence of nausea and vomiting  Outcome: Progressing  Goal: Maintains or returns to baseline bowel function  Outcome: Progressing  Goal: Maintains adequate nutritional intake  Outcome: Progressing     Problem: Metabolic/Fluid and Electrolytes - Adult  Goal: Electrolytes maintained within normal limits  Outcome: Progressing

## 2024-07-11 NOTE — PROGRESS NOTES
07/11/24 145   Care Plan - Respiratory Goals   Achieves optimal ventilation and oxygenation Assess for changes in respiratory status;Oxygen supplementation based on oxygen saturation or arterial blood gases;Encourage broncho-pulmonary hygiene including cough, deep breathe, incentive spirometry;Assess and instruct to report shortness of breath or any respiratory difficulty;Respiratory therapy support as indicated

## 2024-07-12 LAB
ALBUMIN SERPL-MCNC: 2.7 G/DL (ref 3.5–5.2)
ALBUMIN/GLOB SERPL: 0.9 {RATIO} (ref 1–2.5)
ALP SERPL-CCNC: 123 U/L (ref 35–104)
ALT SERPL-CCNC: 153 U/L (ref 5–33)
ANION GAP SERPL CALCULATED.3IONS-SCNC: 10 MMOL/L (ref 9–17)
AST SERPL-CCNC: 115 U/L
BASOPHILS # BLD: 0 K/UL (ref 0–0.2)
BASOPHILS NFR BLD: 0 % (ref 0–2)
BILIRUB SERPL-MCNC: 0.3 MG/DL (ref 0.3–1.2)
BUN SERPL-MCNC: 5 MG/DL (ref 8–23)
CALCIUM SERPL-MCNC: 8.2 MG/DL (ref 8.6–10.4)
CHLORIDE SERPL-SCNC: 101 MMOL/L (ref 98–107)
CO2 SERPL-SCNC: 25 MMOL/L (ref 20–31)
CREAT SERPL-MCNC: 0.5 MG/DL (ref 0.5–0.9)
EOSINOPHIL # BLD: 0.39 K/UL (ref 0–0.4)
EOSINOPHILS RELATIVE PERCENT: 4 % (ref 1–4)
ERYTHROCYTE [DISTWIDTH] IN BLOOD BY AUTOMATED COUNT: 13.5 % (ref 12.5–15.4)
GFR, ESTIMATED: >90 ML/MIN/1.73M2
GLUCOSE SERPL-MCNC: 119 MG/DL (ref 70–99)
HCT VFR BLD AUTO: 33.2 % (ref 36–46)
HGB BLD-MCNC: 11.5 G/DL (ref 12–16)
LYMPHOCYTES NFR BLD: 1.57 K/UL (ref 1–4.8)
LYMPHOCYTES RELATIVE PERCENT: 16 % (ref 24–44)
MAGNESIUM SERPL-MCNC: 2.2 MG/DL (ref 1.6–2.6)
MCH RBC QN AUTO: 29.4 PG (ref 26–34)
MCHC RBC AUTO-ENTMCNC: 34.7 G/DL (ref 31–37)
MCV RBC AUTO: 84.8 FL (ref 80–100)
METAMYELOCYTES ABSOLUTE COUNT: 0.78 K/UL
METAMYELOCYTES: 8 %
MONOCYTES NFR BLD: 0.78 K/UL (ref 0.1–0.8)
MONOCYTES NFR BLD: 8 % (ref 1–7)
MORPHOLOGY: NORMAL
NEUTROPHILS NFR BLD: 64 % (ref 36–66)
NEUTS SEG NFR BLD: 6.28 K/UL (ref 1.8–7.7)
PLATELET # BLD AUTO: 314 K/UL (ref 140–450)
PMV BLD AUTO: 7.8 FL (ref 6–12)
POTASSIUM SERPL-SCNC: 3.5 MMOL/L (ref 3.7–5.3)
PROT SERPL-MCNC: 5.6 G/DL (ref 6.4–8.3)
RBC # BLD AUTO: 3.92 M/UL (ref 4–5.2)
SODIUM SERPL-SCNC: 136 MMOL/L (ref 135–144)
WBC OTHER # BLD: 9.8 K/UL (ref 3.5–11)

## 2024-07-12 PROCEDURE — 99232 SBSQ HOSP IP/OBS MODERATE 35: CPT | Performed by: FAMILY MEDICINE

## 2024-07-12 PROCEDURE — 2580000003 HC RX 258

## 2024-07-12 PROCEDURE — 83735 ASSAY OF MAGNESIUM: CPT

## 2024-07-12 PROCEDURE — 94640 AIRWAY INHALATION TREATMENT: CPT

## 2024-07-12 PROCEDURE — 6360000002 HC RX W HCPCS

## 2024-07-12 PROCEDURE — 36415 COLL VENOUS BLD VENIPUNCTURE: CPT

## 2024-07-12 PROCEDURE — 80053 COMPREHEN METABOLIC PANEL: CPT

## 2024-07-12 PROCEDURE — 2060000000 HC ICU INTERMEDIATE R&B

## 2024-07-12 PROCEDURE — 6370000000 HC RX 637 (ALT 250 FOR IP)

## 2024-07-12 PROCEDURE — 85025 COMPLETE CBC W/AUTO DIFF WBC: CPT

## 2024-07-12 PROCEDURE — 2500000003 HC RX 250 WO HCPCS

## 2024-07-12 RX ORDER — ALBUTEROL SULFATE 90 UG/1
1 AEROSOL, METERED RESPIRATORY (INHALATION)
Status: DISCONTINUED | OUTPATIENT
Start: 2024-07-13 | End: 2024-07-13 | Stop reason: HOSPADM

## 2024-07-12 RX ORDER — ALBUTEROL SULFATE 90 UG/1
1 AEROSOL, METERED RESPIRATORY (INHALATION)
Qty: 18 G | Refills: 0 | Status: CANCELLED | OUTPATIENT
Start: 2024-07-12

## 2024-07-12 RX ORDER — ALBUTEROL SULFATE 90 UG/1
1 AEROSOL, METERED RESPIRATORY (INHALATION)
Status: DISCONTINUED | OUTPATIENT
Start: 2024-07-12 | End: 2024-07-12

## 2024-07-12 RX ADMIN — DOXYCYCLINE 100 MG: 100 INJECTION, POWDER, LYOPHILIZED, FOR SOLUTION INTRAVENOUS at 18:51

## 2024-07-12 RX ADMIN — FAMOTIDINE 20 MG: 20 TABLET ORAL at 09:15

## 2024-07-12 RX ADMIN — CEFTRIAXONE SODIUM 1000 MG: 1 INJECTION, POWDER, FOR SOLUTION INTRAMUSCULAR; INTRAVENOUS at 03:29

## 2024-07-12 RX ADMIN — SODIUM CHLORIDE, PRESERVATIVE FREE 10 ML: 5 INJECTION INTRAVENOUS at 08:30

## 2024-07-12 RX ADMIN — ALBUTEROL SULFATE 1 PUFF: 90 AEROSOL, METERED RESPIRATORY (INHALATION) at 20:08

## 2024-07-12 RX ADMIN — DOXYCYCLINE 100 MG: 100 INJECTION, POWDER, LYOPHILIZED, FOR SOLUTION INTRAVENOUS at 07:16

## 2024-07-12 RX ADMIN — POTASSIUM CHLORIDE 40 MEQ: 1500 TABLET, EXTENDED RELEASE ORAL at 11:46

## 2024-07-12 RX ADMIN — ALBUTEROL SULFATE 1 PUFF: 90 AEROSOL, METERED RESPIRATORY (INHALATION) at 17:01

## 2024-07-12 RX ADMIN — FAMOTIDINE 20 MG: 20 TABLET ORAL at 21:52

## 2024-07-12 RX ADMIN — Medication 3 MG: at 21:52

## 2024-07-12 RX ADMIN — CETIRIZINE HYDROCHLORIDE 10 MG: 10 TABLET, FILM COATED ORAL at 09:15

## 2024-07-12 RX ADMIN — SODIUM CHLORIDE: 9 INJECTION, SOLUTION INTRAVENOUS at 18:47

## 2024-07-12 RX ADMIN — METOPROLOL SUCCINATE 50 MG: 50 TABLET, EXTENDED RELEASE ORAL at 21:52

## 2024-07-12 RX ADMIN — SODIUM CHLORIDE, PRESERVATIVE FREE 10 ML: 5 INJECTION INTRAVENOUS at 21:52

## 2024-07-12 NOTE — PLAN OF CARE
Problem: Safety - Adult  Goal: Free from fall injury  7/12/2024 1544 by Maggie Álvarez RN  Outcome: Progressing     Problem: Discharge Planning  Goal: Discharge to home or other facility with appropriate resources  7/12/2024 1544 by Maggie Álvarez RN  Outcome: Progressing  Flowsheets  Taken 7/12/2024 1540  Discharge to home or other facility with appropriate resources:   Identify barriers to discharge with patient and caregiver   Identify discharge learning needs (meds, wound care, etc)  Taken 7/12/2024 0800  Discharge to home or other facility with appropriate resources:   Identify barriers to discharge with patient and caregiver   Identify discharge learning needs (meds, wound care, etc)     Problem: Gastrointestinal - Adult  Goal: Minimal or absence of nausea and vomiting  7/12/2024 1544 by Maggie Álvarez RN  Outcome: Progressing  Flowsheets  Taken 7/12/2024 1540  Minimal or absence of nausea and vomiting:   Administer ordered antiemetic medications as needed   Provide nonpharmacologic comfort measures as appropriate   Nutrition consult to assist patient with adequate nutrition and appropriate food choices  Taken 7/12/2024 0800  Minimal or absence of nausea and vomiting: Administer IV fluids as ordered to ensure adequate hydration     Problem: Gastrointestinal - Adult  Goal: Maintains or returns to baseline bowel function  7/12/2024 1544 by Maggie Álvarez RN  Outcome: Progressing  Flowsheets  Taken 7/12/2024 1540  Maintains or returns to baseline bowel function:   Assess bowel function   Encourage oral fluids to ensure adequate hydration   Encourage mobilization and activity  Taken 7/12/2024 0800  Maintains or returns to baseline bowel function:   Assess bowel function   Encourage oral fluids to ensure adequate hydration     Problem: Gastrointestinal - Adult  Goal: Maintains adequate nutritional intake  7/12/2024 1544 by Maggie Álvarez RN  Outcome: Progressing  Flowsheets  Taken 7/12/2024 1540  Maintains  adequate nutritional intake:   Monitor percentage of each meal consumed   Identify factors contributing to decreased intake, treat as appropriate  Taken 7/12/2024 0800  Maintains adequate nutritional intake: Monitor percentage of each meal consumed     Problem: Metabolic/Fluid and Electrolytes - Adult  Goal: Electrolytes maintained within normal limits  7/12/2024 1544 by Maggie Álvarez RN  Outcome: Progressing  Flowsheets  Taken 7/12/2024 1540  Electrolytes maintained within normal limits:   Monitor labs and assess patient for signs and symptoms of electrolyte imbalances   Administer electrolyte replacement as ordered   Monitor response to electrolyte replacements, including repeat lab results as appropriate  Taken 7/12/2024 0800  Electrolytes maintained within normal limits:   Monitor labs and assess patient for signs and symptoms of electrolyte imbalances   Administer electrolyte replacement as ordered

## 2024-07-12 NOTE — FLOWSHEET NOTE
Case Management Assessment  Initial Evaluation    Date/Time of Evaluation: 7/12/2024 3:20 PM  Assessment Completed by: Sonia Carson RN    If patient is discharged prior to next notation, then this note serves as note for discharge by case management.    Patient Name: Becca Perez                   YOB: 1955  Diagnosis: Hypocalcemia [E83.51]  Hypokalemia [E87.6]  Hyponatremia [E87.1]  Elevated LFTs [R79.89]  Diarrhea, unspecified type [R19.7]                   Date / Time: 7/10/2024  9:09 PM    Patient Admission Status: Inpatient   Readmission Risk (Low < 19, Mod (19-27), High > 27): Readmission Risk Score: 10.9    Current PCP: Neo Ferrer MD  PCP verified by CM? (P) Yes    Chart Reviewed: Yes      History Provided by: (P) Patient  Patient Orientation: (P) Alert and Oriented    Patient Cognition: (P) Alert    Hospitalization in the last 30 days (Readmission):  Yes    If yes, Readmission Assessment in  Navigator will be completed.    Advance Directives:      Code Status: Full Code   Patient's Primary Decision Maker is: (P) Legal Next of Kin      Discharge Planning:    Patient lives with: (P) Alone Type of Home: (P) House  Primary Care Giver: (P) Self  Patient Support Systems include: (P) Family Members, Friends/Neighbors, [employer], Other (Comment)   Current Financial resources: (P) Medicare  Current community resources: (P) None  Current services prior to admission: (P) None            Current DME:              Type of Home Care services:  (P) None    ADLS  Prior functional level: (P) Independent in ADLs/IADLs  Current functional level: (P) Independent in ADLs/IADLs    PT AM-PAC:   /24  OT AM-PAC:   /24    Family can provide assistance at DC: (P) Yes  Would you like Case Management to discuss the discharge plan with any other family members/significant others, and if so, who? (P) No  Plans to Return to Present Housing: (P) Yes  Other Identified Issues/Barriers to RETURNING to

## 2024-07-12 NOTE — DISCHARGE SUMMARY
[facility]  Inpatient Service    Discharge Summary     Patient ID: Becca Perez  :  1955   MRN: [id number]     ACCOUNT:  [id number]   Patient's PCP: Neo Ferrer MD  Admit Date: 7/10/2024   Discharge Date: 2024  Length of Stay: 3  Code Status:  Prior  Admitting Physician: Neo Sandy MD  Discharge Physician: Law Ferrer MD    Active Discharge Diagnoses:     Hospital Problem Lists:  Principal Problem:    Community acquired pneumonia of right middle lobe of lung  Active Problems:    Class 3 severe obesity due to excess calories without serious comorbidity with body mass index (BMI) of 40.0 to 44.9 in adult (HCC)    Former smoker    Gastroesophageal reflux disease    Vitamin D deficiency    Mixed hyperlipidemia    Benign hypertensive heart disease without congestive heart failure    Hypokalemia    Elevated LFTs    Diverticulosis large intestine w/o perforation or abscess w/o bleeding    Anemia  Resolved Problems:    Hyponatremia    Hypochloremia    Diarrhea      Admission Condition:  poor     Discharged Condition: good    Hospital Stay:     Hospital Course:  Becca Perez is a 68 y.o. female with a PMH of HTN, GERD, Migraines, Diverticulosis who presented c/o vomiting, diarrhea, and low oral intake. CT chest showed pneumonia, CMP revealed hyponatremia and Elevated LFTs. Patient was admitted for the management of pneumonia and electrolyte abnormalities. Patient was placed on NS 50 cc/hr and started on Rocephin and doxycyline. Na trended upwards and corrected (124 on admission to 137 on discharge). LFTs trended downwards.  Symptoms of nausea, vomiting, diarrhea resolved during hospitalization.  Patient discharged in stable condition on Amoxicillin 1 g PO q8H 2 days and doxycycline 100 mg PO q12H  4 days,albuterol inhaler and incentive spirometer.    Significant therapeutic interventions: X-ray, MRI, US liver    Significant Diagnostic  Studies:   Labs:  Hematology:  Recent Labs     07/10/24  2142 07/11/24  0606 07/12/24  0558 07/13/24  0620   WBC 12.4* 11.8* 9.8 11.3*   RBC 4.31 4.11 3.92* 4.02   HGB 12.4 11.9* 11.5* 11.7*   HCT 36.9 35.1* 33.2* 34.4*   MCV 85.6 85.5 84.8 85.6   MCH 28.9 29.0 29.4 29.1   MCHC 33.7 34.0 34.7 34.0   RDW 12.8 13.0 13.5 13.5    295 314 385   MPV 8.1 8.1 7.8 7.7   INR 1.0  --   --   --      Chemistry:  Recent Labs     07/10/24  2142 07/11/24  0025 07/11/24  0606 07/12/24  0558 07/13/24  0620   *  --  128* 136 137   K 3.5*  --  3.8 3.5* 3.6*   CL 87*  --  95* 101 101   CO2 26  --  24 25 27   GLUCOSE 109*  --  117* 119* 116*   BUN 8  --  7* 5* 4*   CREATININE 0.7  --  0.6 0.5 0.5   MG 2.1  --   --  2.2  --    ANIONGAP 11  --  9 10 9   LABGLOM >90  --  >90 >90 >90   CALCIUM 8.5*  --  8.1* 8.2* 8.3*   PROBNP 269  --   --   --   --    TROPHS 8 8  --   --   --      Recent Labs     07/10/24  2142 07/11/24  0606 07/12/24  0558 07/13/24  0620   * 217* 115* 65*   * 192* 153* 121*   ALKPHOS 158* 135* 123* 118*   BILITOT 0.7 0.5 0.3 0.3   LIPASE 52  --   --   --      ABG:No results found for: \"POCPH\", \"PHART\", \"PH\", \"POCPCO2\", \"IEO6ZIJ\", \"PCO2\", \"POCPO2\", \"PO2ART\", \"PO2\", \"POCHCO3\", \"TZJ4VYX\", \"HCO3\", \"NBEA\", \"PBEA\", \"BEART\", \"BE\", \"THGBART\", \"THB\", \"YJS3LRD\", \"RBHY0CKQ\", \"D9QRMEXP\", \"O2SAT\", \"FIO2\"  Lab Results   Component Value Date/Time    SPECIAL 10ml left arm 07/10/2024 09:49 PM     Lab Results   Component Value Date/Time    CULTURE NO GROWTH 2 DAYS 07/10/2024 09:49 PM       Radiology:  US LIVER    Result Date: 7/11/2024  Hepatic steatosis     CT CHEST ABDOMEN PELVIS W CONTRAST Additional Contrast? None    Result Date: 7/11/2024  1. Consolidating infiltrate throughout the majority of the right upper lobe and mid to upper portions of the right lower lobe with a small associated pleural effusion. Findings are consistent with pneumonia.  This should be followed to complete resolution. 2. No acute

## 2024-07-12 NOTE — PLAN OF CARE
Problem: Safety - Adult  Goal: Free from fall injury  7/12/2024 0225 by Quang Macias RN  Outcome: Progressing  7/11/2024 1540 by Zuleyka Henao RN  Outcome: Progressing     Problem: Discharge Planning  Goal: Discharge to home or other facility with appropriate resources  7/12/2024 0225 by Quang Macias RN  Outcome: Progressing  Flowsheets (Taken 7/11/2024 2300)  Discharge to home or other facility with appropriate resources: Identify barriers to discharge with patient and caregiver  7/11/2024 1540 by Zuleyka Henao RN  Outcome: Progressing  Flowsheets (Taken 7/11/2024 1515)  Discharge to home or other facility with appropriate resources: Identify barriers to discharge with patient and caregiver     Problem: Gastrointestinal - Adult  Goal: Minimal or absence of nausea and vomiting  7/12/2024 0225 by Quang Macias RN  Outcome: Progressing  Flowsheets (Taken 7/11/2024 2300)  Minimal or absence of nausea and vomiting: Administer IV fluids as ordered to ensure adequate hydration  7/11/2024 1540 by Zuleyka Henao RN  Outcome: Progressing  Goal: Maintains or returns to baseline bowel function  7/12/2024 0225 by Quang Macias RN  Outcome: Progressing  Flowsheets (Taken 7/11/2024 2300)  Maintains or returns to baseline bowel function: Assess bowel function  7/11/2024 1540 by Zuleyka Henao RN  Outcome: Progressing  Goal: Maintains adequate nutritional intake  7/12/2024 0225 by Quang Macias RN  Outcome: Progressing  Flowsheets (Taken 7/11/2024 2300)  Maintains adequate nutritional intake: Monitor percentage of each meal consumed  7/11/2024 1540 by Zuleyka Henao RN  Outcome: Progressing     Problem: Metabolic/Fluid and Electrolytes - Adult  Goal: Electrolytes maintained within normal limits  7/12/2024 0225 by Quang Macias RN  Outcome: Progressing  Flowsheets (Taken 7/11/2024 2300)  Electrolytes maintained within normal limits:   Monitor labs and assess patient for signs and symptoms of electrolyte  imbalances   Administer electrolyte replacement as ordered   Monitor response to electrolyte replacements, including repeat lab results as appropriate   Fluid restriction as ordered   Instruct patient on fluid and nutrition restrictions as appropriate  7/11/2024 1540 by Zuleyka Henao, RN  Outcome: Progressing

## 2024-07-12 NOTE — PROGRESS NOTES
[facility]  Inpatient Service     Progress Note  2024    6:42 AM    Name:   Becca Perez  MRN:     [id number]     Acct:      [id number]   Room:   [facility]343-01   Day:  2  Admit Date:  7/10/2024  9:09 PM    PCP:   Neo Ferrer MD  Code Status:  Full Code    Subjective:     Overnight events: No significant overnight events    Pt was examined at bedside in no apparent cardiopulmonary distress. She states she is feeling a lot better, but continues to have cough. Denies any nausea, vomiting, headaches, lightheadedness, dizziness, sob, chest pain, abdominal pain.    Medications:     Allergies:    Allergies   Allergen Reactions    Aspirin Nausea And Vomiting       Current Meds:   Scheduled Meds:    sodium chloride flush  5-40 mL IntraVENous 2 times per day    cefTRIAXone (ROCEPHIN) IV  1,000 mg IntraVENous Q24H    doxycycline (VIBRAMYCIN) IV  100 mg IntraVENous Q12H    cetirizine  10 mg Oral Daily    famotidine  20 mg Oral BID    melatonin  3 mg Oral Nightly    metoprolol succinate  50 mg Oral Nightly    sodium chloride  100 mL IntraVENous Once     Continuous Infusions:    sodium chloride      sodium chloride 50 mL/hr at 24 0900     PRN Meds: sodium chloride flush, sodium chloride, ondansetron **OR** ondansetron, polyethylene glycol, acetaminophen **OR** acetaminophen, potassium chloride **OR** potassium alternative oral replacement **OR** potassium chloride, magnesium sulfate, sodium phosphate 15 mmol in sodium chloride 0.9 % 250 mL IVPB, ipratropium 0.5 mg-albuterol 2.5 mg, traMADol, sodium chloride flush    ROS:   ROS is negative except for points noted above.    Data:     Vitals:  BP (!) 150/65   Pulse 83   Temp 99 °F (37.2 °C) (Oral)   Resp 24   Ht 1.575 m (5' 2\")   Wt 106.6 kg (235 lb)   SpO2 90%   BMI 42.98 kg/m²   Temp (24hrs), Av.9 °F (37.2 °C), Min:98.5 °F (36.9 °C), Max:99 °F (37.2 °C)    No results for input(s): \"POCGLU\" in  MD Dutch  Family Medicine Resident, PGY-1   7/12/2024  6:42 AM      Senior Resident Attestation:    I have independently seen Becca Perez and discussed the assessment and plan with the intern listed above and the attending Neo Ferrer MD. I have reviewed the note and have included any edits or additional information above.     Gatito Livingston DO  Family Medicine Resident, PGY-2   7/12/2024  3:00 PM     Attending Physician Statement  Patient seen with the team on rounds on 7/12/2024  .  Patient resting comfortably in bed.  She states she feels much better possible discharge tomorrow. I have seen and discussed the care of Becca Perez  including pertinent history and exam findings, with the resident. I have reviewed the key elements of all parts of the encounter with the resident at the time of the encounter. I agree with the assessment, plan and orders as documented by the resident.    Neo Ferrer MD   7/12/2024  5:24 PM

## 2024-07-12 NOTE — RT PROTOCOL NOTE
RT Inhaler-Nebulizer Bronchodilator Protocol Note    There is a bronchodilator order in the chart from a provider indicating to follow the RT Bronchodilator Protocol and there is an “Initiate RT Inhaler-Nebulizer Bronchodilator Protocol” order as well (see protocol at bottom of note).    CXR Findings:  XR CHEST PORTABLE    Result Date: 7/10/2024  1. Large masslike consolidative opacity in the right mid lung, concerning for pneumonia or a mass, recommend further evaluation with CT. 2. Suggestion of right hilar lymphadenopathy.       The findings from the last RT Protocol Assessment were as follows:   History Pulmonary Disease: Smoker 15 pack years or more  Respiratory Pattern: Regular pattern and RR 12-20 bpm  Breath Sounds: Clear breath sounds  Cough: Strong, spontaneous, non-productive  Indication for Bronchodilator Therapy: None  Bronchodilator Assessment Score: 1    Aerosolized bronchodilator medication orders have been revised according to the RT Inhaler-Nebulizer Bronchodilator Protocol below.    Respiratory Therapist to perform RT Therapy Protocol Assessment initially then follow the protocol.  Repeat RT Therapy Protocol Assessment PRN for score 0-3 or on second treatment, BID, and PRN for scores above 3.    No Indications - adjust the frequency to every 6 hours PRN wheezing or bronchospasm, if no treatments needed after 48 hours then discontinue using Per Protocol order mode.     If indication present, adjust the RT bronchodilator orders based on the Bronchodilator Assessment Score as indicated below.  Use Inhaler orders unless patient has one or more of the following: on home nebulizer, not able to hold breath for 10 seconds, is not alert and oriented, cannot activate and use MDI correctly, or respiratory rate 25 breaths per minute or more, then use the equivalent nebulizer order(s) with same Frequency and PRN reasons based on the score.  If a patient is on this medication at home then do not decrease

## 2024-07-12 NOTE — RT PROTOCOL NOTE
RT Inhaler-Nebulizer Bronchodilator Protocol Note    There is a bronchodilator order in the chart from a provider indicating to follow the RT Bronchodilator Protocol and there is an “Initiate RT Inhaler-Nebulizer Bronchodilator Protocol” order as well (see protocol at bottom of note).    CXR Findings:  XR CHEST PORTABLE    Result Date: 7/10/2024  1. Large masslike consolidative opacity in the right mid lung, concerning for pneumonia or a mass, recommend further evaluation with CT. 2. Suggestion of right hilar lymphadenopathy.       The findings from the last RT Protocol Assessment were as follows:   History Pulmonary Disease: Smoker 15 pack years or more  Respiratory Pattern: Regular pattern and RR 12-20 bpm  Breath Sounds: Slightly diminished and/or crackles  Cough: Strong, productive  Indication for Bronchodilator Therapy: None  Bronchodilator Assessment Score: 4    Aerosolized bronchodilator medication orders have been revised according to the RT Inhaler-Nebulizer Bronchodilator Protocol below.    Respiratory Therapist to perform RT Therapy Protocol Assessment initially then follow the protocol.  Repeat RT Therapy Protocol Assessment PRN for score 0-3 or on second treatment, BID, and PRN for scores above 3.    No Indications - adjust the frequency to every 6 hours PRN wheezing or bronchospasm, if no treatments needed after 48 hours then discontinue using Per Protocol order mode.     If indication present, adjust the RT bronchodilator orders based on the Bronchodilator Assessment Score as indicated below.  Use Inhaler orders unless patient has one or more of the following: on home nebulizer, not able to hold breath for 10 seconds, is not alert and oriented, cannot activate and use MDI correctly, or respiratory rate 25 breaths per minute or more, then use the equivalent nebulizer order(s) with same Frequency and PRN reasons based on the score.  If a patient is on this medication at home then do not decrease

## 2024-07-12 NOTE — DISCHARGE INSTRUCTIONS
Date of admission: 7/10/2024  Date of discharge: 07/13/24    Your care was provided by the attending and resident physicians of the [facility]. The primary encounter diagnosis was Diarrhea, unspecified type. Diagnoses of Hyponatremia, Hypokalemia, Hypocalcemia, and Elevated LFTs were also pertinent to this visit.    FOLLOW-UP  - Follow up with your primary care physician Neo Ferrer in 3 days.    MEDICATIONS  -  your Amoxicillin and Doxycycline from the pharmacy and take as directed.  - Continue taking your other home medications as directed.    ADDITIONAL INSTRUCTIONS  - Use incentive spirometer to improve lung strength.  - Please return immediately to the [facility] Emergency Department if you have any fever, chills, nausea, vomiting, or any other concerns.

## 2024-07-13 VITALS
RESPIRATION RATE: 16 BRPM | OXYGEN SATURATION: 93 % | HEIGHT: 62 IN | BODY MASS INDEX: 43.24 KG/M2 | DIASTOLIC BLOOD PRESSURE: 65 MMHG | HEART RATE: 72 BPM | TEMPERATURE: 98.6 F | SYSTOLIC BLOOD PRESSURE: 139 MMHG | WEIGHT: 235 LBS

## 2024-07-13 PROBLEM — E87.8 HYPOCHLOREMIA: Status: RESOLVED | Noted: 2024-07-10 | Resolved: 2024-07-13

## 2024-07-13 PROBLEM — E87.1 HYPONATREMIA: Status: RESOLVED | Noted: 2024-07-10 | Resolved: 2024-07-13

## 2024-07-13 PROBLEM — Z87.891 FORMER SMOKER: Status: ACTIVE | Noted: 2023-11-14

## 2024-07-13 PROBLEM — R19.7 DIARRHEA: Status: RESOLVED | Noted: 2024-07-11 | Resolved: 2024-07-13

## 2024-07-13 LAB
ALBUMIN SERPL-MCNC: 2.7 G/DL (ref 3.5–5.2)
ALBUMIN/GLOB SERPL: 0.9 {RATIO} (ref 1–2.5)
ALP SERPL-CCNC: 118 U/L (ref 35–104)
ALT SERPL-CCNC: 121 U/L (ref 5–33)
ANION GAP SERPL CALCULATED.3IONS-SCNC: 9 MMOL/L (ref 9–17)
AST SERPL-CCNC: 65 U/L
BASOPHILS # BLD: 0 K/UL (ref 0–0.2)
BASOPHILS NFR BLD: 0 % (ref 0–2)
BILIRUB SERPL-MCNC: 0.3 MG/DL (ref 0.3–1.2)
BUN SERPL-MCNC: 4 MG/DL (ref 8–23)
CALCIUM SERPL-MCNC: 8.3 MG/DL (ref 8.6–10.4)
CHLORIDE SERPL-SCNC: 101 MMOL/L (ref 98–107)
CO2 SERPL-SCNC: 27 MMOL/L (ref 20–31)
CREAT SERPL-MCNC: 0.5 MG/DL (ref 0.5–0.9)
EOSINOPHIL # BLD: 0.57 K/UL (ref 0–0.4)
EOSINOPHILS RELATIVE PERCENT: 5 % (ref 1–4)
ERYTHROCYTE [DISTWIDTH] IN BLOOD BY AUTOMATED COUNT: 13.5 % (ref 12.5–15.4)
GFR, ESTIMATED: >90 ML/MIN/1.73M2
GLUCOSE SERPL-MCNC: 116 MG/DL (ref 70–99)
HCT VFR BLD AUTO: 34.4 % (ref 36–46)
HGB BLD-MCNC: 11.7 G/DL (ref 12–16)
LYMPHOCYTES NFR BLD: 2.71 K/UL (ref 1–4.8)
LYMPHOCYTES RELATIVE PERCENT: 24 % (ref 24–44)
MCH RBC QN AUTO: 29.1 PG (ref 26–34)
MCHC RBC AUTO-ENTMCNC: 34 G/DL (ref 31–37)
MCV RBC AUTO: 85.6 FL (ref 80–100)
METAMYELOCYTES ABSOLUTE COUNT: 0.9 K/UL
METAMYELOCYTES: 8 %
MONOCYTES NFR BLD: 0.79 K/UL (ref 0.1–0.8)
MONOCYTES NFR BLD: 7 % (ref 1–7)
MORPHOLOGY: NORMAL
MYELOCYTES ABSOLUTE COUNT: 0.45 K/UL
MYELOCYTES: 4 %
NEUTROPHILS NFR BLD: 52 % (ref 36–66)
NEUTS SEG NFR BLD: 5.88 K/UL (ref 1.8–7.7)
PLATELET # BLD AUTO: 385 K/UL (ref 140–450)
PMV BLD AUTO: 7.7 FL (ref 6–12)
POTASSIUM SERPL-SCNC: 3.6 MMOL/L (ref 3.7–5.3)
PROT SERPL-MCNC: 5.6 G/DL (ref 6.4–8.3)
RBC # BLD AUTO: 4.02 M/UL (ref 4–5.2)
SODIUM SERPL-SCNC: 137 MMOL/L (ref 135–144)
WBC OTHER # BLD: 11.3 K/UL (ref 3.5–11)

## 2024-07-13 PROCEDURE — 36415 COLL VENOUS BLD VENIPUNCTURE: CPT

## 2024-07-13 PROCEDURE — 80053 COMPREHEN METABOLIC PANEL: CPT

## 2024-07-13 PROCEDURE — 6360000002 HC RX W HCPCS

## 2024-07-13 PROCEDURE — 6370000000 HC RX 637 (ALT 250 FOR IP)

## 2024-07-13 PROCEDURE — 85025 COMPLETE CBC W/AUTO DIFF WBC: CPT

## 2024-07-13 PROCEDURE — 99238 HOSP IP/OBS DSCHRG MGMT 30/<: CPT | Performed by: FAMILY MEDICINE

## 2024-07-13 PROCEDURE — 2580000003 HC RX 258

## 2024-07-13 PROCEDURE — 2500000003 HC RX 250 WO HCPCS

## 2024-07-13 PROCEDURE — 94640 AIRWAY INHALATION TREATMENT: CPT

## 2024-07-13 RX ORDER — DOXYCYCLINE HYCLATE 100 MG
100 TABLET ORAL 2 TIMES DAILY
Qty: 7 TABLET | Refills: 0 | Status: SHIPPED | OUTPATIENT
Start: 2024-07-13 | End: 2024-07-17

## 2024-07-13 RX ORDER — AMOXICILLIN 500 MG/1
1000 CAPSULE ORAL 3 TIMES DAILY
Qty: 12 CAPSULE | Refills: 0 | Status: SHIPPED | OUTPATIENT
Start: 2024-07-14 | End: 2024-07-16

## 2024-07-13 RX ADMIN — CEFTRIAXONE SODIUM 1000 MG: 1 INJECTION, POWDER, FOR SOLUTION INTRAMUSCULAR; INTRAVENOUS at 03:06

## 2024-07-13 RX ADMIN — POLYETHYLENE GLYCOL 3350 17 G: 17 POWDER, FOR SOLUTION ORAL at 07:34

## 2024-07-13 RX ADMIN — ALBUTEROL SULFATE 1 PUFF: 90 AEROSOL, METERED RESPIRATORY (INHALATION) at 08:09

## 2024-07-13 RX ADMIN — POTASSIUM CHLORIDE 40 MEQ: 1500 TABLET, EXTENDED RELEASE ORAL at 07:22

## 2024-07-13 RX ADMIN — FAMOTIDINE 20 MG: 20 TABLET ORAL at 07:22

## 2024-07-13 RX ADMIN — CETIRIZINE HYDROCHLORIDE 10 MG: 10 TABLET, FILM COATED ORAL at 07:22

## 2024-07-13 RX ADMIN — DOXYCYCLINE 100 MG: 100 INJECTION, POWDER, LYOPHILIZED, FOR SOLUTION INTRAVENOUS at 06:44

## 2024-07-13 NOTE — PROGRESS NOTES
07/13/24 0810   Care Plan - Respiratory Goals   Achieves optimal ventilation and oxygenation Assess for changes in respiratory status;Oxygen supplementation based on oxygen saturation or arterial blood gases;Assess and instruct to report shortness of breath or any respiratory difficulty;Respiratory therapy support as indicated;Encourage broncho-pulmonary hygiene including cough, deep breathe, incentive spirometry

## 2024-07-13 NOTE — PLAN OF CARE
Problem: Gastrointestinal - Adult  Goal: Minimal or absence of nausea and vomiting  7/13/2024 1021 by Rudolph Stout RN  Outcome: Completed  7/13/2024 1021 by Rudolph Stout RN  Outcome: Progressing  Flowsheets (Taken 7/13/2024 0735)  Minimal or absence of nausea and vomiting: Administer IV fluids as ordered to ensure adequate hydration  Goal: Maintains or returns to baseline bowel function  7/13/2024 1021 by Rudolph Stout RN  Outcome: Completed  7/13/2024 1021 by Rudolph Stout RN  Outcome: Progressing  Flowsheets (Taken 7/13/2024 0735)  Maintains or returns to baseline bowel function: Assess bowel function  Goal: Maintains adequate nutritional intake  7/13/2024 1021 by Rudolph Stout RN  Outcome: Completed  7/13/2024 1021 by Rudolph Stout RN  Outcome: Progressing  Flowsheets (Taken 7/13/2024 0735)  Maintains adequate nutritional intake: Monitor intake and output, weight and lab values     Problem: Metabolic/Fluid and Electrolytes - Adult  Goal: Electrolytes maintained within normal limits  7/13/2024 1021 by Rudolph Stout RN  Outcome: Completed  7/13/2024 1021 by Rudolph Stout RN  Outcome: Progressing     Problem: Safety - Adult  Goal: Free from fall injury  7/13/2024 1021 by Rudolph Stout RN  Outcome: Completed  7/13/2024 1021 by Rudolph Stout RN  Outcome: Progressing     Problem: Discharge Planning  Goal: Discharge to home or other facility with appropriate resources  7/13/2024 1021 by Rudolph Stout RN  Outcome: Completed  7/13/2024 1021 by Rudolph Stout RN  Outcome: Progressing

## 2024-07-13 NOTE — PROGRESS NOTES
incarceration.     XR CHEST PORTABLE    Result Date: 7/10/2024  1. Large masslike consolidative opacity in the right mid lung, concerning for pneumonia or a mass, recommend further evaluation with CT. 2. Suggestion of right hilar lymphadenopathy.       Physical Examination:   Physical Exam  Vitals reviewed.   Constitutional:       General: She is not in acute distress.     Appearance: She is obese. She is not ill-appearing.   HENT:      Head: Normocephalic and atraumatic.   Eyes:      Extraocular Movements: Extraocular movements intact.      Conjunctiva/sclera: Conjunctivae normal.      Pupils: Pupils are equal, round, and reactive to light.   Cardiovascular:      Rate and Rhythm: Normal rate and regular rhythm.      Heart sounds: Normal heart sounds.   Pulmonary:      Effort: Pulmonary effort is normal.      Breath sounds: Normal air entry. Examination of the right-middle field reveals rales. Examination of the right-lower field reveals rales. Rales present. No decreased breath sounds, wheezing or rhonchi.   Musculoskeletal:         General: Normal range of motion.   Skin:     General: Skin is warm and dry.      Capillary Refill: Capillary refill takes less than 2 seconds.   Neurological:      General: No focal deficit present.      Mental Status: She is alert and oriented to person, place, and time. Mental status is at baseline.       Assessment:     Hospital Problems             Last Modified POA    * (Principal) Community acquired pneumonia of right middle lobe of lung 7/11/2024 Yes    Class 3 severe obesity due to excess calories without serious comorbidity with body mass index (BMI) of 40.0 to 44.9 in adult (HCC) 7/10/2024 Yes    Non-smoker 7/10/2024 Yes    Gastroesophageal reflux disease 7/10/2024 Yes    Vitamin D deficiency 7/10/2024 Yes    Mixed hyperlipidemia 7/10/2024 Yes    Benign hypertensive heart disease without congestive heart failure 7/10/2024 Yes    Hyponatremia 7/11/2024 Yes    Hypokalemia

## 2024-07-14 LAB
MICROORGANISM SPEC CULT: NORMAL
MICROORGANISM SPEC CULT: NORMAL
SERVICE CMNT-IMP: NORMAL
SERVICE CMNT-IMP: NORMAL
SPECIMEN DESCRIPTION: NORMAL
SPECIMEN DESCRIPTION: NORMAL

## 2024-07-15 ENCOUNTER — TELEPHONE (OUTPATIENT)
Age: 69
End: 2024-07-15

## 2024-07-15 NOTE — TELEPHONE ENCOUNTER
Patient insurance will not cover for her to have the Opti Chamber, so you will need to write a script for something similar to that. They did not say what they will cover.

## 2024-07-16 NOTE — TELEPHONE ENCOUNTER
I spoke with the patient.  She is going to check with the drugstore to see if she needs a prescription for a spacer.  She is willing to pay for it.  If she needs a prescription she will either call the office or message me through [employer].  Thank you

## 2024-07-16 NOTE — TELEPHONE ENCOUNTER
Patient can search online on how to DIY a spacer for the inhaler with a water bottle. Not all insurances will cover the spacer even if I write for another (original script was generic). Alternatively she can also use the inhaler without the spacer but the medication won't be delivered as well into the lungs.

## 2024-07-17 NOTE — PROGRESS NOTES
Behavior normal.         Thought Content: Thought content normal.         Judgment: Judgment normal.         Limited Point-of-Care Ultrasound Note  Consent: Verbal informed consent was obtained prior to beginning the ultrasound. The patient voiced an understanding and was amenable to proceeding.          Procedure:  Limited ultrasound of the right lung  Performed by: Dr. Awadalla  Sonographic question: This patient have a pneumonia  Findings: Evidence of consolidation noted in the right middle lobe    This study is a limited ultrasound examination performed and interpreted to evaluate for limited conditions as outlined above. There may be other clinically important information contained in the images that is outside this scope. When clinically warranted, a comprehensive ultrasound through the appropriate department is ordered.      Electronically signed by Salah Awadalla, MD on 7/19/2024 at 4:59 PM

## 2024-07-17 NOTE — PROGRESS NOTES
Refer to Clinical Documentation Reviewer    PROVIDER RESPONSE TEXT:    This patient is being treated for a aspiration pneumonia.    Query created by: Stephy Robin on 7/17/2024 6:12 AM      Electronically signed by:  SANDY ROBBINS 7/17/2024 7:13 AM

## 2024-07-19 ENCOUNTER — OFFICE VISIT (OUTPATIENT)
Age: 69
End: 2024-07-19

## 2024-07-19 VITALS
TEMPERATURE: 97.5 F | RESPIRATION RATE: 16 BRPM | BODY MASS INDEX: 43.21 KG/M2 | HEART RATE: 71 BPM | HEIGHT: 62 IN | OXYGEN SATURATION: 96 % | WEIGHT: 234.8 LBS | DIASTOLIC BLOOD PRESSURE: 56 MMHG | SYSTOLIC BLOOD PRESSURE: 126 MMHG

## 2024-07-19 DIAGNOSIS — Z09 HOSPITAL DISCHARGE FOLLOW-UP: Primary | ICD-10-CM

## 2024-07-19 DIAGNOSIS — L65.9 FALLING HAIR: ICD-10-CM

## 2024-07-19 DIAGNOSIS — R11.0 NAUSEA: ICD-10-CM

## 2024-07-19 DIAGNOSIS — R79.89 ELEVATED LFTS: ICD-10-CM

## 2024-07-19 DIAGNOSIS — J18.9 PNEUMONIA OF RIGHT MIDDLE LOBE DUE TO INFECTIOUS ORGANISM: ICD-10-CM

## 2024-07-19 DIAGNOSIS — E66.01 CLASS 3 SEVERE OBESITY DUE TO EXCESS CALORIES WITHOUT SERIOUS COMORBIDITY WITH BODY MASS INDEX (BMI) OF 45.0 TO 49.9 IN ADULT (HCC): ICD-10-CM

## 2024-07-19 DIAGNOSIS — R10.13 EPIGASTRIC PAIN: ICD-10-CM

## 2024-07-19 DIAGNOSIS — Z87.891 FORMER SMOKER: ICD-10-CM

## 2024-07-19 RX ORDER — ONDANSETRON 4 MG/1
4 TABLET, ORALLY DISINTEGRATING ORAL 3 TIMES DAILY PRN
Qty: 6 TABLET | Refills: 0 | Status: SHIPPED | OUTPATIENT
Start: 2024-07-19 | End: 2024-07-21

## 2024-07-19 ASSESSMENT — ENCOUNTER SYMPTOMS
ABDOMINAL PAIN: 1
COUGH: 0
DIARRHEA: 0
NAUSEA: 1
WHEEZING: 0
SHORTNESS OF BREATH: 0
SORE THROAT: 0
VOMITING: 0

## 2024-07-19 NOTE — PATIENT INSTRUCTIONS
Thank you for coming into the clinic today,  -As discussed continue eating yogurt and take your probiotics   -I will refill your Zofran for a couple more days, if this persists I would recommend following up with your gastroenterologist. I believe the Nausea is due to the doxycycline antibiotic    -I will add a hepatitis panel, TSH, iron, TIBC, and ferritin to check for your increased hair loss and increased liver enzymes  -Please get your X-ray completed in 6 weeks   -Please feel free to send us any nonurgent medical questions through my portal or call to speak with the on-call physician for more urgent medical question

## 2024-08-10 ENCOUNTER — HOSPITAL ENCOUNTER (OUTPATIENT)
Dept: MAMMOGRAPHY | Age: 69
End: 2024-08-10
Payer: MEDICARE

## 2024-08-10 DIAGNOSIS — Z12.31 VISIT FOR SCREENING MAMMOGRAM: ICD-10-CM

## 2024-08-10 PROCEDURE — 77063 BREAST TOMOSYNTHESIS BI: CPT

## 2024-08-16 ENCOUNTER — HOSPITAL ENCOUNTER (OUTPATIENT)
Age: 69
Discharge: HOME | End: 2024-08-16
Payer: MEDICARE

## 2024-08-16 ENCOUNTER — HOSPITAL ENCOUNTER (OUTPATIENT)
Age: 69
End: 2024-08-16
Payer: MEDICARE

## 2024-08-16 ENCOUNTER — HOSPITAL ENCOUNTER (OUTPATIENT)
Dept: GENERAL RADIOLOGY | Age: 69
End: 2024-08-16
Payer: MEDICARE

## 2024-08-16 DIAGNOSIS — J18.9 COMMUNITY ACQUIRED PNEUMONIA OF RIGHT MIDDLE LOBE OF LUNG: ICD-10-CM

## 2024-08-16 DIAGNOSIS — L65.9 FALLING HAIR: ICD-10-CM

## 2024-08-16 DIAGNOSIS — I11.9 BENIGN HYPERTENSIVE HEART DISEASE WITHOUT CONGESTIVE HEART FAILURE: ICD-10-CM

## 2024-08-16 DIAGNOSIS — E55.9 VITAMIN D DEFICIENCY: ICD-10-CM

## 2024-08-16 DIAGNOSIS — R73.01 IMPAIRED FASTING GLUCOSE: ICD-10-CM

## 2024-08-16 LAB
25(OH)D3 SERPL-MCNC: 45.2 NG/ML (ref 30–100)
BASOPHILS # BLD: 0.14 K/UL (ref 0–0.2)
BASOPHILS NFR BLD: 2 % (ref 0–2)
CHOLEST SERPL-MCNC: 207 MG/DL (ref 0–199)
CHOLESTEROL/HDL RATIO: 5
CREAT UR-MCNC: 46.8 MG/DL (ref 28–217)
EOSINOPHIL # BLD: 0.13 K/UL (ref 0–0.44)
EOSINOPHILS RELATIVE PERCENT: 2 % (ref 1–4)
ERYTHROCYTE [DISTWIDTH] IN BLOOD BY AUTOMATED COUNT: 12.9 % (ref 11.8–14.4)
EST. AVERAGE GLUCOSE BLD GHB EST-MCNC: 103 MG/DL
EST. AVERAGE GLUCOSE BLD GHB EST-MCNC: 103 MG/DL
FERRITIN SERPL-MCNC: 112 NG/ML (ref 13–150)
HBA1C MFR BLD: 5.2 % (ref 4–6)
HBA1C MFR BLD: 5.2 % (ref 4–6)
HCT VFR BLD AUTO: 42.6 % (ref 36.3–47.1)
HDLC SERPL-MCNC: 41 MG/DL
HGB BLD-MCNC: 14.2 G/DL (ref 11.9–15.1)
IMM GRANULOCYTES # BLD AUTO: 0.03 K/UL (ref 0–0.3)
IMM GRANULOCYTES NFR BLD: 0 %
IRON SATN MFR SERPL: 32 % (ref 20–55)
IRON SERPL-MCNC: 110 UG/DL (ref 37–145)
LDLC SERPL CALC-MCNC: 128 MG/DL (ref 0–100)
LYMPHOCYTES NFR BLD: 2.23 K/UL (ref 1.1–3.7)
LYMPHOCYTES RELATIVE PERCENT: 32 % (ref 24–43)
MCH RBC QN AUTO: 29.2 PG (ref 25.2–33.5)
MCHC RBC AUTO-ENTMCNC: 33.3 G/DL (ref 28.4–34.8)
MCV RBC AUTO: 87.5 FL (ref 82.6–102.9)
MICROALBUMIN UR-MCNC: <12 MG/L (ref 0–20)
MICROALBUMIN/CREAT UR-RTO: NORMAL MCG/MG CREAT (ref 0–25)
MONOCYTES NFR BLD: 0.55 K/UL (ref 0.1–1.2)
MONOCYTES NFR BLD: 8 % (ref 3–12)
NEUTROPHILS NFR BLD: 56 % (ref 36–65)
NEUTS SEG NFR BLD: 3.95 K/UL (ref 1.5–8.1)
NRBC BLD-RTO: 0 PER 100 WBC
PLATELET # BLD AUTO: 302 K/UL (ref 138–453)
PMV BLD AUTO: 10.6 FL (ref 8.1–13.5)
RBC # BLD AUTO: 4.87 M/UL (ref 3.95–5.11)
TIBC SERPL-MCNC: 347 UG/DL (ref 250–450)
TRIGL SERPL-MCNC: 188 MG/DL
TSH SERPL DL<=0.05 MIU/L-ACNC: 1.81 UIU/ML (ref 0.27–4.2)
UNSATURATED IRON BINDING CAPACITY: 237 UG/DL (ref 112–347)
VLDLC SERPL CALC-MCNC: 38 MG/DL
WBC OTHER # BLD: 7 K/UL (ref 3.5–11.3)

## 2024-08-16 PROCEDURE — 83550 IRON BINDING TEST: CPT

## 2024-08-16 PROCEDURE — 83036 HEMOGLOBIN GLYCOSYLATED A1C: CPT

## 2024-08-16 PROCEDURE — 82306 VITAMIN D 25 HYDROXY: CPT

## 2024-08-16 PROCEDURE — 82570 ASSAY OF URINE CREATININE: CPT

## 2024-08-16 PROCEDURE — 36415 COLL VENOUS BLD VENIPUNCTURE: CPT

## 2024-08-16 PROCEDURE — 84443 ASSAY THYROID STIM HORMONE: CPT

## 2024-08-16 PROCEDURE — 80061 LIPID PANEL: CPT

## 2024-08-16 PROCEDURE — 82043 UR ALBUMIN QUANTITATIVE: CPT

## 2024-08-16 PROCEDURE — 83540 ASSAY OF IRON: CPT

## 2024-08-16 PROCEDURE — 85025 COMPLETE CBC W/AUTO DIFF WBC: CPT

## 2024-08-16 PROCEDURE — 82728 ASSAY OF FERRITIN: CPT

## 2024-08-16 PROCEDURE — 71046 X-RAY EXAM CHEST 2 VIEWS: CPT

## 2024-09-02 NOTE — PROGRESS NOTES
[facility]  [address] [place] [address]  Phone: [phone]  Fax: [phone]      Date of Visit:  2024  Patient Name: Becca Perez   Patient :  1955     Assessment/Plan:     1. Benign hypertensive heart disease without congestive heart failure  Comments:  Stable continue current medication added Lipitor  Orders:  -     atorvastatin (LIPITOR) 10 MG tablet; Take 1 tablet by mouth daily, Disp-90 tablet, R-3Normal  2. Gastroesophageal reflux disease, unspecified whether esophagitis present  Comments:  Under control, encouraged to cut down on PPI within the next 3 months to once a day if possible  3. Trochanteric bursitis of both hips  Comments:  Under control, continue Voltaren gel  4. Class 3 severe obesity due to excess calories without serious comorbidity with body mass index (BMI) of 40.0 to 44.9 in adult (HCC)  Comments:  Overall gradual decreased continue on current diet and lifestyle changes, not a candidate for GLP-1 medications.  5. Former smoker  6. Pure hypercholesterolemia  -     atorvastatin (LIPITOR) 10 MG tablet; Take 1 tablet by mouth daily, Disp-90 tablet, R-3Normal  7. Elevated LFTs  Comments:  Trending down, repeat in 3 months  Orders:  -     Comprehensive Metabolic Panel; Future       Plan:    BMI was elevated today, and weight loss plan recommended is : conventional weight loss.   Doing well.           Patient Instructions   Great to see you today.  Keep up the good work.  I will see you back in 3 months.  You can start on the Lipitor when she gets back from [place].  We will recheck the liver enzymes in 3 months.    Since you had the Pneumovax 23 in  you are eligible to get the Pneumovax 20 which is the latest pneumonia shot and you will not need any other pneumonia shots for life.  You can get that here or at the drugstore.    I would recommend this fall that you get the RSV, the influenza and the COVID

## 2024-09-05 ENCOUNTER — OFFICE VISIT (OUTPATIENT)
Age: 69
End: 2024-09-05
Payer: MEDICARE

## 2024-09-05 VITALS
TEMPERATURE: 98.4 F | HEART RATE: 72 BPM | WEIGHT: 238 LBS | DIASTOLIC BLOOD PRESSURE: 65 MMHG | BODY MASS INDEX: 43.79 KG/M2 | SYSTOLIC BLOOD PRESSURE: 128 MMHG | RESPIRATION RATE: 16 BRPM | HEIGHT: 62 IN

## 2024-09-05 DIAGNOSIS — Z87.891 FORMER SMOKER: ICD-10-CM

## 2024-09-05 DIAGNOSIS — E78.00 PURE HYPERCHOLESTEROLEMIA: ICD-10-CM

## 2024-09-05 DIAGNOSIS — E66.01 CLASS 3 SEVERE OBESITY DUE TO EXCESS CALORIES WITHOUT SERIOUS COMORBIDITY WITH BODY MASS INDEX (BMI) OF 40.0 TO 44.9 IN ADULT (HCC): ICD-10-CM

## 2024-09-05 DIAGNOSIS — I11.9 BENIGN HYPERTENSIVE HEART DISEASE WITHOUT CONGESTIVE HEART FAILURE: Primary | ICD-10-CM

## 2024-09-05 DIAGNOSIS — M70.61 TROCHANTERIC BURSITIS OF BOTH HIPS: ICD-10-CM

## 2024-09-05 DIAGNOSIS — K21.9 GASTROESOPHAGEAL REFLUX DISEASE, UNSPECIFIED WHETHER ESOPHAGITIS PRESENT: ICD-10-CM

## 2024-09-05 DIAGNOSIS — R79.89 ELEVATED LFTS: ICD-10-CM

## 2024-09-05 DIAGNOSIS — M70.62 TROCHANTERIC BURSITIS OF BOTH HIPS: ICD-10-CM

## 2024-09-05 PROCEDURE — 99212 OFFICE O/P EST SF 10 MIN: CPT | Performed by: FAMILY MEDICINE

## 2024-09-05 PROCEDURE — 99214 OFFICE O/P EST MOD 30 MIN: CPT | Performed by: FAMILY MEDICINE

## 2024-09-05 PROCEDURE — 1123F ACP DISCUSS/DSCN MKR DOCD: CPT | Performed by: FAMILY MEDICINE

## 2024-09-05 RX ORDER — ATORVASTATIN CALCIUM 10 MG/1
10 TABLET, FILM COATED ORAL DAILY
Qty: 90 TABLET | Refills: 3 | Status: SHIPPED | OUTPATIENT
Start: 2024-09-05

## 2024-09-05 NOTE — PATIENT INSTRUCTIONS
Great to see you today.  Keep up the good work.  I will see you back in 3 months.  You can start on the Lipitor when she gets back from [place].  We will recheck the liver enzymes in 3 months.    Since you had the Pneumovax 23 in 2021 you are eligible to get the Pneumovax 20 which is the latest pneumonia shot and you will not need any other pneumonia shots for life.  You can get that here or at the drugstore.    I would recommend this fall that you get the RSV, the influenza and the COVID immunizations.  Again all these can be done at the pharmacy.    We will refill your tramadol before you go to [place].        Patient Education        A Healthy Lifestyle: Care Instructions  A healthy lifestyle can help you feel good, have more energy, and stay at a weight that's healthy for you. You can share a healthy lifestyle with your friends and family. And you can do it on your own.    Eat meals with your friends or family. You could try cooking together.   Plan activities with other people. Go for a walk with a friend, try a free online fitness class, or join a sports league.     Eat a variety of healthy foods. These include fruits, vegetables, whole grains, low-fat dairy, and lean protein.   Choose healthy portions of food. You can use the Nutrition Facts label on food packages as a guide.     Eat more fruits and vegetables. You could add vegetables to sandwiches or add fruit to cereal.   Drink water when you are thirsty. Limit soda, juice, and sports drinks.     Try to exercise most days. Aim for at least 2½ hours of exercise each week.   Keep moving. Work in the garden or take your dog on a walk. Use the stairs instead of the elevator.     If you use tobacco or nicotine, try to quit. Ask your doctor about programs and medicines to help you quit.   Limit alcohol. Men should have no more than 2 drinks a day. Women should have no more than 1. For some people, no alcohol is the best choice.   Follow-up care is a key part of

## 2024-09-17 DIAGNOSIS — M16.0 PRIMARY OSTEOARTHRITIS OF BOTH HIPS: ICD-10-CM

## 2024-09-17 RX ORDER — TRAMADOL HYDROCHLORIDE 50 MG/1
50 TABLET ORAL EVERY 8 HOURS PRN
Qty: 60 TABLET | Refills: 0 | Status: SHIPPED | OUTPATIENT
Start: 2024-09-17 | End: 2024-10-17

## 2024-10-28 DIAGNOSIS — M16.0 PRIMARY OSTEOARTHRITIS OF BOTH HIPS: ICD-10-CM

## 2024-10-28 RX ORDER — TRAMADOL HYDROCHLORIDE 50 MG/1
50 TABLET ORAL EVERY 8 HOURS PRN
Qty: 60 TABLET | Refills: 0 | Status: SHIPPED | OUTPATIENT
Start: 2024-10-28 | End: 2024-11-27

## 2024-11-04 RX ORDER — METOPROLOL SUCCINATE 50 MG/1
50 TABLET, EXTENDED RELEASE ORAL DAILY
Qty: 90 TABLET | Refills: 3 | Status: SHIPPED | OUTPATIENT
Start: 2024-11-04

## 2024-11-12 DIAGNOSIS — K21.9 GASTROESOPHAGEAL REFLUX DISEASE, UNSPECIFIED WHETHER ESOPHAGITIS PRESENT: ICD-10-CM

## 2024-11-12 DIAGNOSIS — R10.13 EPIGASTRIC PAIN: ICD-10-CM

## 2024-11-12 DIAGNOSIS — R14.2 BELCHING: ICD-10-CM

## 2024-11-12 RX ORDER — FAMOTIDINE 20 MG/1
20 TABLET, FILM COATED ORAL 2 TIMES DAILY
Qty: 60 TABLET | Refills: 3 | Status: SHIPPED | OUTPATIENT
Start: 2024-11-12

## 2024-12-06 DIAGNOSIS — I11.9 BENIGN HYPERTENSIVE HEART DISEASE WITHOUT CONGESTIVE HEART FAILURE: ICD-10-CM

## 2024-12-06 DIAGNOSIS — R79.89 ELEVATED LFTS: ICD-10-CM

## 2024-12-24 NOTE — PROGRESS NOTES
[facility]  [address] [place] [address]  Phone: [phone]  Fax: [phone]      Date of Visit:  2024  Patient Name: Becca Perez   Patient :  1955     Assessment/Plan:     1. Benign hypertensive heart disease without congestive heart failure  Comments:  Slightly elevated today but usually controlled  2. LFT elevation  Comments:  Resolved.  Labs reviewed.  3. Gastroesophageal reflux disease, unspecified whether esophagitis present  Comments:  Patient using famotidine 20 mg once a day and Pepcid Complete if needed  Orders:  -     famotidine (PEPCID) 20 MG tablet; Take 1 tablet by mouth daily, Disp-90 tablet, R-3Normal  4. Former smoker  5. Class 3 severe obesity due to excess calories without serious comorbidity with body mass index (BMI) of 40.0 to 44.9 in adult  Comments:  Patient states she has lost 2 dress sizes and 17 pounds.  Our scale does not reflect this.  Possible weight gain in the past week       Plan:    BMI was elevated today, and weight loss plan recommended is : conventional weight loss.        Patient Instructions   Congratulations to your weight loss.  I would continue to recommend a diet focused on vegetables, fruits, health protein that minimizes sweets, watch more than 1 portion of carbohydrates in a meal, avoid high sugar beverages and excess red meats.      Maintain a healthy BMI to 30    Work to a lifestyle of regular a aerobic exercise = 20 minutes of moderate to intense physical activity  3-4 times a week.      Requested Prescriptions     Signed Prescriptions Disp Refills    famotidine (PEPCID) 20 MG tablet 90 tablet 3     Sig: Take 1 tablet by mouth daily       Medications Discontinued During This Encounter   Medication Reason    atorvastatin (LIPITOR) 10 MG tablet Patient Choice    famotidine (PEPCID) 20 MG tablet REORDER       Return in about 5 months (around 2025) for Followup.    Becca was

## 2024-12-26 ENCOUNTER — OFFICE VISIT (OUTPATIENT)
Age: 69
End: 2024-12-26
Payer: MEDICARE

## 2024-12-26 VITALS
WEIGHT: 244 LBS | TEMPERATURE: 98 F | SYSTOLIC BLOOD PRESSURE: 122 MMHG | BODY MASS INDEX: 44.63 KG/M2 | DIASTOLIC BLOOD PRESSURE: 68 MMHG | RESPIRATION RATE: 18 BRPM | HEART RATE: 68 BPM

## 2024-12-26 DIAGNOSIS — E66.813 CLASS 3 SEVERE OBESITY DUE TO EXCESS CALORIES WITHOUT SERIOUS COMORBIDITY WITH BODY MASS INDEX (BMI) OF 40.0 TO 44.9 IN ADULT: ICD-10-CM

## 2024-12-26 DIAGNOSIS — K21.9 GASTROESOPHAGEAL REFLUX DISEASE, UNSPECIFIED WHETHER ESOPHAGITIS PRESENT: ICD-10-CM

## 2024-12-26 DIAGNOSIS — R79.89 LFT ELEVATION: ICD-10-CM

## 2024-12-26 DIAGNOSIS — Z87.891 FORMER SMOKER: ICD-10-CM

## 2024-12-26 DIAGNOSIS — I11.9 BENIGN HYPERTENSIVE HEART DISEASE WITHOUT CONGESTIVE HEART FAILURE: Primary | ICD-10-CM

## 2024-12-26 DIAGNOSIS — E66.01 CLASS 3 SEVERE OBESITY DUE TO EXCESS CALORIES WITHOUT SERIOUS COMORBIDITY WITH BODY MASS INDEX (BMI) OF 40.0 TO 44.9 IN ADULT: ICD-10-CM

## 2024-12-26 PROCEDURE — 99214 OFFICE O/P EST MOD 30 MIN: CPT | Performed by: FAMILY MEDICINE

## 2024-12-26 RX ORDER — FAMOTIDINE 20 MG/1
20 TABLET, FILM COATED ORAL DAILY
Qty: 90 TABLET | Refills: 3 | Status: SHIPPED | OUTPATIENT
Start: 2024-12-26

## 2024-12-26 SDOH — ECONOMIC STABILITY: INCOME INSECURITY: HOW HARD IS IT FOR YOU TO PAY FOR THE VERY BASICS LIKE FOOD, HOUSING, MEDICAL CARE, AND HEATING?: NOT HARD AT ALL

## 2024-12-26 SDOH — ECONOMIC STABILITY: FOOD INSECURITY: WITHIN THE PAST 12 MONTHS, YOU WORRIED THAT YOUR FOOD WOULD RUN OUT BEFORE YOU GOT MONEY TO BUY MORE.: NEVER TRUE

## 2024-12-26 SDOH — ECONOMIC STABILITY: FOOD INSECURITY: WITHIN THE PAST 12 MONTHS, THE FOOD YOU BOUGHT JUST DIDN'T LAST AND YOU DIDN'T HAVE MONEY TO GET MORE.: NEVER TRUE

## 2024-12-26 NOTE — PATIENT INSTRUCTIONS
Congratulations to your weight loss.  I would continue to recommend a diet focused on vegetables, fruits, health protein that minimizes sweets, watch more than 1 portion of carbohydrates in a meal, avoid high sugar beverages and excess red meats.      Maintain a healthy BMI to 30    Work to a lifestyle of regular a aerobic exercise = 20 minutes of moderate to intense physical activity  3-4 times a week.

## 2025-01-07 DIAGNOSIS — M16.0 PRIMARY OSTEOARTHRITIS OF BOTH HIPS: ICD-10-CM

## 2025-01-07 RX ORDER — TRAMADOL HYDROCHLORIDE 50 MG/1
50 TABLET ORAL EVERY 8 HOURS PRN
Qty: 60 TABLET | Refills: 0 | Status: SHIPPED | OUTPATIENT
Start: 2025-01-07 | End: 2025-02-06

## 2025-01-26 ENCOUNTER — PATIENT MESSAGE (OUTPATIENT)
Age: 70
End: 2025-01-26

## 2025-02-19 ENCOUNTER — APPOINTMENT (OUTPATIENT)
Dept: GENERAL RADIOLOGY | Facility: CLINIC | Age: 70
End: 2025-02-19
Payer: MEDICARE

## 2025-02-19 ENCOUNTER — HOSPITAL ENCOUNTER (EMERGENCY)
Facility: CLINIC | Age: 70
Discharge: HOME | End: 2025-02-19
Attending: EMERGENCY MEDICINE
Payer: MEDICARE

## 2025-02-19 ENCOUNTER — TELEPHONE (OUTPATIENT)
Age: 70
End: 2025-02-19

## 2025-02-19 VITALS
RESPIRATION RATE: 17 BRPM | HEIGHT: 62 IN | TEMPERATURE: 98.4 F | HEART RATE: 75 BPM | OXYGEN SATURATION: 99 % | WEIGHT: 234 LBS | BODY MASS INDEX: 43.06 KG/M2 | SYSTOLIC BLOOD PRESSURE: 169 MMHG | DIASTOLIC BLOOD PRESSURE: 69 MMHG

## 2025-02-19 DIAGNOSIS — S86.911A STRAIN OF RIGHT KNEE, INITIAL ENCOUNTER: Primary | ICD-10-CM

## 2025-02-19 PROCEDURE — 73562 X-RAY EXAM OF KNEE 3: CPT

## 2025-02-19 PROCEDURE — 99283 EMERGENCY DEPT VISIT LOW MDM: CPT

## 2025-02-19 ASSESSMENT — LIFESTYLE VARIABLES
HOW MANY STANDARD DRINKS CONTAINING ALCOHOL DO YOU HAVE ON A TYPICAL DAY: PATIENT DOES NOT DRINK
HOW OFTEN DO YOU HAVE A DRINK CONTAINING ALCOHOL: NEVER

## 2025-02-19 NOTE — ED PROVIDER NOTES
[facility]  eMERGENCY dEPARTMENT eNCOUnter      Pt Name: Becca Perez  MRN: [id number]  Birthdate 1955  Date of evaluation: 2/19/2025  Provider: Miguel Dockery PA-C    CHIEF COMPLAINT       Chief Complaint   Patient presents with    Knee Pain           HISTORY OF PRESENT ILLNESS  (Location/Symptom, Timing/Onset, Context/Setting, Quality, Duration, Modifying Factors, Severity.)   Becca Perez is a 69 y.o. female who presents to the emergency department complaining of right knee pain.  States that she fell a few weeks ago on a cruise ship.  She has been taking over-the-counter medication and tramadol for other chronic pain which has not been really helping with the pain.  She states that it was swollen and she was going to see her doctor who instructed her to come get an x-ray.  Denies any new injuries, denies any numbness or tingling, states that it is still swollen presents for eval.      Nursing Notes were reviewed.    REVIEW OF SYSTEMS    (2-9 systems for level 4, 10 or more for level 5)     Review of Systems       Except as noted above the remainder of the review of systems was reviewed and negative.       PAST MEDICAL HISTORY     Past Medical History:   Diagnosis Date    Bronchitis     GERD (gastroesophageal reflux disease)     Hay fever     History of diverticula of colon     Hypertension     Migraines     Obesity      None otherwise stated in nurses notes    SURGICAL HISTORY       Past Surgical History:   Procedure Laterality Date    FOOT SURGERY      HEMORRHOID SURGERY      KNEE ARTHROPLASTY       None otherwise stated in nurses notes    CURRENT MEDICATIONS       Previous Medications    ACETAMINOPHEN (TYLENOL EXTRA STRENGTH PO)    Take by mouth On  per day with tramadol    ALBUTEROL SULFATE HFA (VENTOLIN HFA) 108 (90 BASE) MCG/ACT INHALER    Inhale 1 puff into the lungs in the morning and at bedtime    ASCORBIC ACID (VITAMIN C PO)    Take by mouth Zinc, Vitamin D, combo

## 2025-02-19 NOTE — ED PROVIDER NOTES
[facility]  eMERGENCY dEPARTMENT eNCOUnter   Attending Physician Attestation    Pt Name: Becca Perez  MRN: [id number]  Birthdate 1955  Date of evaluation: 2/19/25        I personally made/approves the management plan for this patient's and take responsibility for the patient management.      (Please note that portions of this note were completed with a voice recognition program.  Efforts were made to edit the dictations but occasionally words are mis-transcribed.)    Adan Ferro DO  Attending Emergency  Physician                Adan Ferro DO  02/19/25 1318

## 2025-02-19 NOTE — PROGRESS NOTES
[facility]  [address] [place] [address]  Phone: [phone]  Fax: [phone]      Date of Visit:  2025  Patient Name: Becca Perez   Patient :  1955     Assessment/Plan:     1. Acute traumatic internal derangement of right knee, initial encounter  2. Bilateral chronic serous otitis media  3. At high risk for falls       Plan:    BMI was elevated today, and weight loss plan recommended is : conventional weight loss.            Patient Instructions   Thanks for coming in today.    Continue with the Voltaren gel as well as ice.  Glad that you already have a note for work that you can utilize a chair.  I would like you to do range of motion as we discussed with bending the knee 5-10 times twice a day as far as you can go without pain just to prevent stiffness.  If things are not improving or if you have increased swelling or locking of the knee please let me know and we will order further imaging.    For serous otitis, continue to use salt water nose spray and your Claritin.    I will see you back in May for your follow-up for blood pressure.     Requested Prescriptions      No prescriptions requested or ordered in this encounter       Medications Discontinued During This Encounter   Medication Reason    Spacer/Aero-Holding Chambers (AEROCHAMBER HOLDING CHAMBER) JENNIFER Therapy completed       Return in about 3 months (around 2025), or As scheduled for blood pressure, for HTN followup.    Becca was given educational materials: See patient instructions. Discussed use, benefit, and side effects of prescribed medications.  Barriers to medication compliance addressed. All patient questions answered.  Pt voiced understanding.     Subjective:      HPI    Becca Perez is a 69 y.o. female with Hx of  has a past medical history of Bronchitis, GERD (gastroesophageal reflux disease), Hay fever, History of diverticula of colon,

## 2025-02-20 ENCOUNTER — OFFICE VISIT (OUTPATIENT)
Age: 70
End: 2025-02-20
Payer: MEDICARE

## 2025-02-20 VITALS
TEMPERATURE: 98 F | BODY MASS INDEX: 44.35 KG/M2 | WEIGHT: 241 LBS | RESPIRATION RATE: 16 BRPM | HEART RATE: 76 BPM | DIASTOLIC BLOOD PRESSURE: 60 MMHG | SYSTOLIC BLOOD PRESSURE: 138 MMHG | HEIGHT: 62 IN

## 2025-02-20 DIAGNOSIS — Z91.81 AT HIGH RISK FOR FALLS: ICD-10-CM

## 2025-02-20 DIAGNOSIS — H65.23 BILATERAL CHRONIC SEROUS OTITIS MEDIA: ICD-10-CM

## 2025-02-20 DIAGNOSIS — S83.104A ACUTE TRAUMATIC INTERNAL DERANGEMENT OF RIGHT KNEE, INITIAL ENCOUNTER: Primary | ICD-10-CM

## 2025-02-20 PROCEDURE — 1123F ACP DISCUSS/DSCN MKR DOCD: CPT | Performed by: FAMILY MEDICINE

## 2025-02-20 PROCEDURE — 1126F AMNT PAIN NOTED NONE PRSNT: CPT | Performed by: FAMILY MEDICINE

## 2025-02-20 PROCEDURE — 1159F MED LIST DOCD IN RCRD: CPT | Performed by: FAMILY MEDICINE

## 2025-02-20 PROCEDURE — 99212 OFFICE O/P EST SF 10 MIN: CPT | Performed by: FAMILY MEDICINE

## 2025-02-20 PROCEDURE — 99214 OFFICE O/P EST MOD 30 MIN: CPT | Performed by: FAMILY MEDICINE

## 2025-02-20 PROCEDURE — 1160F RVW MEDS BY RX/DR IN RCRD: CPT | Performed by: FAMILY MEDICINE

## 2025-02-20 RX ORDER — FAMOTIDINE 10 MG
10 TABLET ORAL 2 TIMES DAILY
COMMUNITY

## 2025-02-20 SDOH — ECONOMIC STABILITY: FOOD INSECURITY: WITHIN THE PAST 12 MONTHS, YOU WORRIED THAT YOUR FOOD WOULD RUN OUT BEFORE YOU GOT MONEY TO BUY MORE.: NEVER TRUE

## 2025-02-20 SDOH — ECONOMIC STABILITY: FOOD INSECURITY: WITHIN THE PAST 12 MONTHS, THE FOOD YOU BOUGHT JUST DIDN'T LAST AND YOU DIDN'T HAVE MONEY TO GET MORE.: NEVER TRUE

## 2025-02-20 ASSESSMENT — PATIENT HEALTH QUESTIONNAIRE - PHQ9
SUM OF ALL RESPONSES TO PHQ QUESTIONS 1-9: 0
SUM OF ALL RESPONSES TO PHQ QUESTIONS 1-9: 0
SUM OF ALL RESPONSES TO PHQ9 QUESTIONS 1 & 2: 0
2. FEELING DOWN, DEPRESSED OR HOPELESS: NOT AT ALL
1. LITTLE INTEREST OR PLEASURE IN DOING THINGS: NOT AT ALL
SUM OF ALL RESPONSES TO PHQ QUESTIONS 1-9: 0
SUM OF ALL RESPONSES TO PHQ QUESTIONS 1-9: 0

## 2025-02-20 ASSESSMENT — ENCOUNTER SYMPTOMS
SINUS PAIN: 0
SINUS PRESSURE: 0
TROUBLE SWALLOWING: 0

## 2025-02-20 NOTE — PATIENT INSTRUCTIONS
Thanks for coming in today.    Continue with the Voltaren gel as well as ice.  Glad that you already have a note for work that you can utilize a chair.  I would like you to do range of motion as we discussed with bending the knee 5-10 times twice a day as far as you can go without pain just to prevent stiffness.  If things are not improving or if you have increased swelling or locking of the knee please let me know and we will order further imaging.    For serous otitis, continue to use salt water nose spray and your Claritin.    I will see you back in May for your follow-up for blood pressure.

## 2025-03-03 DIAGNOSIS — M16.0 PRIMARY OSTEOARTHRITIS OF BOTH HIPS: ICD-10-CM

## 2025-03-03 RX ORDER — TRAMADOL HYDROCHLORIDE 50 MG/1
50 TABLET ORAL EVERY 8 HOURS PRN
Qty: 60 TABLET | Refills: 0 | Status: SHIPPED | OUTPATIENT
Start: 2025-03-03 | End: 2025-04-02

## 2025-03-03 NOTE — TELEPHONE ENCOUNTER
Patient also stated that her knee pain is not getting any better inspite of everything she was told to do. Wanted to know if she should go ahead and have the MRI done?

## 2025-03-11 ENCOUNTER — TELEPHONE (OUTPATIENT)
Age: 70
End: 2025-03-11

## 2025-03-11 DIAGNOSIS — S83.104S: Primary | ICD-10-CM

## 2025-03-11 NOTE — TELEPHONE ENCOUNTER
Patient called and stated that her knee isn't getting any better and wanted to know if she should go ahead and have the MRI done? Please give her a call.

## 2025-03-18 ENCOUNTER — RESULTS FOLLOW-UP (OUTPATIENT)
Age: 70
End: 2025-03-18

## 2025-03-18 DIAGNOSIS — S83.271S COMPLEX TEAR OF LATERAL MENISCUS OF RIGHT KNEE AS CURRENT INJURY, SEQUELA: Primary | ICD-10-CM

## 2025-03-18 DIAGNOSIS — S83.104S: ICD-10-CM

## 2025-03-19 NOTE — TELEPHONE ENCOUNTER
Spoke with the patient and gave her the results.  She is continuing to have pain referral is made on this note.  She voices good understanding.

## 2025-03-23 SDOH — HEALTH STABILITY: PHYSICAL HEALTH: ON AVERAGE, HOW MANY MINUTES DO YOU ENGAGE IN EXERCISE AT THIS LEVEL?: 0 MIN

## 2025-03-23 SDOH — HEALTH STABILITY: PHYSICAL HEALTH: ON AVERAGE, HOW MANY DAYS PER WEEK DO YOU ENGAGE IN MODERATE TO STRENUOUS EXERCISE (LIKE A BRISK WALK)?: 0 DAYS

## 2025-03-26 ENCOUNTER — OFFICE VISIT (OUTPATIENT)
Age: 70
End: 2025-03-26

## 2025-03-26 ENCOUNTER — TELEPHONE (OUTPATIENT)
Age: 70
End: 2025-03-26

## 2025-03-26 ENCOUNTER — HOSPITAL ENCOUNTER (OUTPATIENT)
Age: 70
Discharge: HOME | End: 2025-03-28
Payer: MEDICARE

## 2025-03-26 VITALS — HEIGHT: 62 IN | WEIGHT: 241 LBS | BODY MASS INDEX: 44.35 KG/M2

## 2025-03-26 DIAGNOSIS — M17.11 PRIMARY OSTEOARTHRITIS OF RIGHT KNEE: ICD-10-CM

## 2025-03-26 DIAGNOSIS — M25.561 RIGHT KNEE PAIN, UNSPECIFIED CHRONICITY: Primary | ICD-10-CM

## 2025-03-26 PROCEDURE — 73562 X-RAY EXAM OF KNEE 3: CPT

## 2025-03-26 RX ADMIN — LIDOCAINE HYDROCHLORIDE 2 ML: 10 INJECTION, SOLUTION INFILTRATION; PERINEURAL at 08:25

## 2025-03-26 RX ADMIN — METHYLPREDNISOLONE ACETATE 80 MG: 80 INJECTION, SUSPENSION INTRA-ARTICULAR; INTRALESIONAL; INTRAMUSCULAR; SOFT TISSUE at 08:25

## 2025-03-26 NOTE — PROGRESS NOTES
[facility]      [facility]THOPAEDICS AND SPORTS MEDICINE  [address]  [place] [address]  Dept: [phone]  Dept Fax: [phone]    Chief Compliant:  Chief Complaint   Patient presents with    Knee Pain     R knee - meniscus tear        History of Present Illness:  This is a pleasant 69 y.o. female who presents to the clinic today for evaluation / follow up of right knee pain.  Patient notes that she has been dealing with ongoing right knee pain for about 7 weeks.  She states that she was on a cruise and had a fall and landed directly onto her knee.  She As her most of her pain was.  She does state that her pain has improved.  She notes that she has difficulty getting in and out of cars.  She also notes that she has pain when she is sleeping.  She has been using Voltaren gel and taking tramadol as needed for pain.  She presents today for further evaluation.      Physical Exam:    Right knee: Patient is able to fully extend her knee on exam today.  She can flex back to roughly 110 degrees on exam.  She does have significant tenderness to palpation along the medial and lateral lines.  She does also have some tenderness to palpation of her quad tendon.  No tenderness over the patella or patellar tendon.  No tenderness posteriorly.  She has no palpable effusion on exam today.  No prepatellar bursitis present today.  She stable varus valgus stress at 0 and 30 degrees today.    Nursing note and vitals reviewed.     Labs and Imaging:   3 views of the right knee taken outside facility were independent interpreted today which shows some lateral compartment joint space narrowing formation present as well as subchondral sclerosis.  No fracture appreciated.  No acute process.    MRI results were reviewed.  Unable to obtain images.  Impression notes degenerative arthritis as well as complex tear of the

## 2025-03-27 RX ORDER — METHYLPREDNISOLONE ACETATE 80 MG/ML
80 INJECTION, SUSPENSION INTRA-ARTICULAR; INTRALESIONAL; INTRAMUSCULAR; SOFT TISSUE ONCE
Status: COMPLETED | OUTPATIENT
Start: 2025-03-27 | End: 2025-03-26

## 2025-03-27 RX ORDER — LIDOCAINE HYDROCHLORIDE 10 MG/ML
2 INJECTION, SOLUTION INFILTRATION; PERINEURAL ONCE
Status: COMPLETED | OUTPATIENT
Start: 2025-03-27 | End: 2025-03-26

## 2025-04-09 ENCOUNTER — OFFICE VISIT (OUTPATIENT)
Age: 70
End: 2025-04-09
Payer: MEDICARE

## 2025-04-09 VITALS — WEIGHT: 241 LBS | HEIGHT: 62 IN | BODY MASS INDEX: 44.35 KG/M2

## 2025-04-09 DIAGNOSIS — S83.203D OTHER TEAR OF MENISCUS OF RIGHT KNEE, UNSPECIFIED MENISCUS, UNSPECIFIED WHETHER OLD OR CURRENT TEAR, SUBSEQUENT ENCOUNTER: ICD-10-CM

## 2025-04-09 DIAGNOSIS — M17.11 PRIMARY OSTEOARTHRITIS OF RIGHT KNEE: Primary | ICD-10-CM

## 2025-04-09 PROCEDURE — 99214 OFFICE O/P EST MOD 30 MIN: CPT | Performed by: ORTHOPAEDIC SURGERY

## 2025-04-09 PROCEDURE — 1123F ACP DISCUSS/DSCN MKR DOCD: CPT | Performed by: ORTHOPAEDIC SURGERY

## 2025-04-09 PROCEDURE — 1159F MED LIST DOCD IN RCRD: CPT | Performed by: ORTHOPAEDIC SURGERY

## 2025-04-09 NOTE — PROGRESS NOTES
0 min   Stress: Not on file   Social Connections: Not on file   Intimate Partner Violence: Unknown (2/22/2024)    Received from [facility], [facility]     Fear of Current or Ex-Partner: Not on file     Emotionally Abused: Not on file     Physically Abused: Not on file     Sexually Abused: Not on file     Physically or Sexually Abused: Not on file   Housing Stability: Low Risk  (2/20/2025)    Housing Stability Vital Sign     Unable to Pay for Housing in the Last Year: No     Number of Times Moved in the Last Year: 0     Homeless in the Last Year: No     Past Medical History:   Diagnosis Date    Bronchitis     GERD (gastroesophageal reflux disease)     Hay fever     History of diverticula of colon     Hypertension     Migraines     Obesity      Past Surgical History:   Procedure Laterality Date    FOOT SURGERY      HEMORRHOID SURGERY      KNEE ARTHROPLASTY       Family History   Problem Relation Age of Onset    Alzheimer's Disease Mother     Diabetes Mother     Lupus Father     No Known Problems Sister     No Known Problems Brother           Please note that this chart was generated using voice recognition Dragon dictation software.  Although every effort was made to ensure the accuracy of this automated transcription, some errors in transcription may have occurred.

## 2025-04-24 DIAGNOSIS — M16.0 PRIMARY OSTEOARTHRITIS OF BOTH HIPS: ICD-10-CM

## 2025-04-25 RX ORDER — TRAMADOL HYDROCHLORIDE 50 MG/1
50 TABLET ORAL EVERY 8 HOURS PRN
Qty: 60 TABLET | Refills: 0 | Status: SHIPPED | OUTPATIENT
Start: 2025-04-25 | End: 2025-05-25

## 2025-05-20 NOTE — PROGRESS NOTES
[facility]  [address] [place] [address]  Phone: [phone]  Fax: [phone]      Date of Visit:  2025  Patient Name: Becca Perez   Patient :  1955     Assessment/Plan:     1. Benign hypertensive heart disease without congestive heart failure  Comments:  Continue medication and lifestyle change  2. Class 3 severe obesity due to excess calories without serious comorbidity with body mass index (BMI) of 40.0 to 44.9 in adult (HCC)  Comments:  Continue lifestyle change.  Not interested in medication in intervention at the moment.  3. Gastroesophageal reflux disease, unspecified whether esophagitis present  Comments:  Continue with medication and lifestyle changes.  Follow-up with gastroenterology next year  4. Pure hypercholesterolemia  Comments:  Continue with medication and lifestyle change  5. Impingement of right shoulder  -     External Referral To Physical Therapy  6. Sprain of right rotator cuff capsule, initial encounter  Comments:  Physical therapy referral.  Orders:  -     External Referral To Physical Therapy       Assessment & Plan  1. Hives.  - The hives are likely due to an external allergen exposure rather than an internal cause, as they are localized to specific areas rather than being widespread.  - Physical examination revealed nodular type lesions, approximately 3-4 mm, healing on the lower legs.  - Advised to continue using calamine lotion without Benadryl to avoid potential sensitization to Benadryl.  - Patient reported improvement in itching after a course of prednisone and use of calamine and Benadryl lotion.    2. Viral upper respiratory tract infection.  - Symptoms include nasal congestion, yellow nasal discharge, and nocturnal wheezing.  - Physical examination showed serous otitis in the left ear, postnasal drip, and clear lungs without wheezing.  - Advised to continue taking Mucinex or Delsym, but not

## 2025-05-22 ENCOUNTER — OFFICE VISIT (OUTPATIENT)
Age: 70
End: 2025-05-22
Payer: MEDICARE

## 2025-05-22 VITALS
RESPIRATION RATE: 16 BRPM | DIASTOLIC BLOOD PRESSURE: 49 MMHG | WEIGHT: 242 LBS | TEMPERATURE: 97.9 F | SYSTOLIC BLOOD PRESSURE: 139 MMHG | BODY MASS INDEX: 44.53 KG/M2 | HEART RATE: 58 BPM | HEIGHT: 62 IN

## 2025-05-22 DIAGNOSIS — E66.813 CLASS 3 SEVERE OBESITY DUE TO EXCESS CALORIES WITHOUT SERIOUS COMORBIDITY WITH BODY MASS INDEX (BMI) OF 40.0 TO 44.9 IN ADULT (HCC): ICD-10-CM

## 2025-05-22 DIAGNOSIS — I11.9 BENIGN HYPERTENSIVE HEART DISEASE WITHOUT CONGESTIVE HEART FAILURE: Primary | ICD-10-CM

## 2025-05-22 DIAGNOSIS — S43.421A SPRAIN OF RIGHT ROTATOR CUFF CAPSULE, INITIAL ENCOUNTER: ICD-10-CM

## 2025-05-22 DIAGNOSIS — J06.9 VIRAL URI: ICD-10-CM

## 2025-05-22 DIAGNOSIS — K21.9 GASTROESOPHAGEAL REFLUX DISEASE, UNSPECIFIED WHETHER ESOPHAGITIS PRESENT: ICD-10-CM

## 2025-05-22 DIAGNOSIS — M25.811 IMPINGEMENT OF RIGHT SHOULDER: ICD-10-CM

## 2025-05-22 DIAGNOSIS — E78.00 PURE HYPERCHOLESTEROLEMIA: ICD-10-CM

## 2025-05-22 PROCEDURE — 1159F MED LIST DOCD IN RCRD: CPT | Performed by: FAMILY MEDICINE

## 2025-05-22 PROCEDURE — 1123F ACP DISCUSS/DSCN MKR DOCD: CPT | Performed by: FAMILY MEDICINE

## 2025-05-22 PROCEDURE — 99214 OFFICE O/P EST MOD 30 MIN: CPT | Performed by: FAMILY MEDICINE

## 2025-05-22 NOTE — PATIENT INSTRUCTIONS
It was great to see you today.  I would like to see you back at 3 months and we will do your Medicare annual wellness at that time.    I am believing that shoulder will be healed up with physical therapy.    Remember to bring your medications in their bottles when you go to [place] and my recommendation would be to be sure you have an insurance rider that covers you while you are over there.

## 2025-06-16 DIAGNOSIS — M16.0 PRIMARY OSTEOARTHRITIS OF BOTH HIPS: ICD-10-CM

## 2025-06-16 RX ORDER — TRAMADOL HYDROCHLORIDE 50 MG/1
50 TABLET ORAL EVERY 12 HOURS PRN
Qty: 60 TABLET | Refills: 0 | Status: SHIPPED | OUTPATIENT
Start: 2025-06-16 | End: 2025-07-16

## 2025-08-27 SDOH — HEALTH STABILITY: PHYSICAL HEALTH: ON AVERAGE, HOW MANY DAYS PER WEEK DO YOU ENGAGE IN MODERATE TO STRENUOUS EXERCISE (LIKE A BRISK WALK)?: 4 DAYS

## 2025-08-27 SDOH — HEALTH STABILITY: PHYSICAL HEALTH: ON AVERAGE, HOW MANY MINUTES DO YOU ENGAGE IN EXERCISE AT THIS LEVEL?: 20 MIN

## 2025-08-27 ASSESSMENT — PATIENT HEALTH QUESTIONNAIRE - PHQ9
SUM OF ALL RESPONSES TO PHQ QUESTIONS 1-9: 0
2. FEELING DOWN, DEPRESSED OR HOPELESS: NOT AT ALL
SUM OF ALL RESPONSES TO PHQ QUESTIONS 1-9: 0
1. LITTLE INTEREST OR PLEASURE IN DOING THINGS: NOT AT ALL

## 2025-08-27 ASSESSMENT — LIFESTYLE VARIABLES
HOW OFTEN DO YOU HAVE A DRINK CONTAINING ALCOHOL: 3
HOW OFTEN DO YOU HAVE A DRINK CONTAINING ALCOHOL: 2-4 TIMES A MONTH
HOW MANY STANDARD DRINKS CONTAINING ALCOHOL DO YOU HAVE ON A TYPICAL DAY: 1 OR 2
HOW OFTEN DO YOU HAVE SIX OR MORE DRINKS ON ONE OCCASION: 1
HOW MANY STANDARD DRINKS CONTAINING ALCOHOL DO YOU HAVE ON A TYPICAL DAY: 1

## 2025-08-28 ENCOUNTER — OFFICE VISIT (OUTPATIENT)
Age: 70
End: 2025-08-28
Payer: MEDICARE

## 2025-08-28 VITALS
DIASTOLIC BLOOD PRESSURE: 63 MMHG | SYSTOLIC BLOOD PRESSURE: 131 MMHG | HEIGHT: 62 IN | HEART RATE: 68 BPM | BODY MASS INDEX: 44.53 KG/M2 | RESPIRATION RATE: 16 BRPM | WEIGHT: 242 LBS | TEMPERATURE: 97.8 F

## 2025-08-28 DIAGNOSIS — M16.0 PRIMARY OSTEOARTHRITIS OF BOTH HIPS: ICD-10-CM

## 2025-08-28 DIAGNOSIS — Z00.00 MEDICARE ANNUAL WELLNESS VISIT, SUBSEQUENT: Primary | ICD-10-CM

## 2025-08-28 PROCEDURE — 1160F RVW MEDS BY RX/DR IN RCRD: CPT | Performed by: FAMILY MEDICINE

## 2025-08-28 PROCEDURE — 1159F MED LIST DOCD IN RCRD: CPT | Performed by: FAMILY MEDICINE

## 2025-08-28 PROCEDURE — 1123F ACP DISCUSS/DSCN MKR DOCD: CPT | Performed by: FAMILY MEDICINE

## 2025-08-28 PROCEDURE — G0439 PPPS, SUBSEQ VISIT: HCPCS | Performed by: FAMILY MEDICINE

## 2025-08-28 RX ORDER — TRAMADOL HYDROCHLORIDE 50 MG/1
50 TABLET ORAL EVERY 12 HOURS PRN
Qty: 60 TABLET | Refills: 0 | Status: SHIPPED | OUTPATIENT
Start: 2025-08-28 | End: 2025-09-27